# Patient Record
Sex: FEMALE | Race: WHITE | NOT HISPANIC OR LATINO | Employment: OTHER | URBAN - METROPOLITAN AREA
[De-identification: names, ages, dates, MRNs, and addresses within clinical notes are randomized per-mention and may not be internally consistent; named-entity substitution may affect disease eponyms.]

---

## 2024-09-28 ENCOUNTER — ANESTHESIA EVENT (OUTPATIENT)
Dept: SURGERY | Facility: HOSPITAL | Age: 79
End: 2024-09-28
Payer: MEDICARE

## 2024-09-28 ENCOUNTER — HOSPITAL ENCOUNTER (INPATIENT)
Facility: HOSPITAL | Age: 79
LOS: 4 days | Discharge: NURSING, REHAB OR LONG-TERM CARE | DRG: 481 | End: 2024-10-02
Attending: EMERGENCY MEDICINE | Admitting: HOSPITALIST
Payer: MEDICARE

## 2024-09-28 DIAGNOSIS — D62 ACUTE BLOOD LOSS ANEMIA: ICD-10-CM
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
## 2024-09-28 DIAGNOSIS — E83.39 HYPOPHOSPHATEMIA: ICD-10-CM

## 2024-09-28 DIAGNOSIS — Z86.79 HISTORY OF CAROTID STENOSIS: ICD-10-CM

## 2024-09-28 DIAGNOSIS — I10 HYPERTENSION, UNSPECIFIED TYPE: ICD-10-CM

## 2024-09-28 DIAGNOSIS — T14.90XA TRAUMA: ICD-10-CM

## 2024-09-28 DIAGNOSIS — I07.1 MILD TRICUSPID REGURGITATION: ICD-10-CM

## 2024-09-28 DIAGNOSIS — I50.32 CHRONIC DIASTOLIC HEART FAILURE: ICD-10-CM

## 2024-09-28 DIAGNOSIS — E55.9 VITAMIN D INSUFFICIENCY: ICD-10-CM

## 2024-09-28 DIAGNOSIS — E87.1 HYPONATREMIA: ICD-10-CM

## 2024-09-28 DIAGNOSIS — I34.0 MILD MITRAL REGURGITATION: ICD-10-CM

## 2024-09-28 DIAGNOSIS — S72.141A CLOSED COMMINUTED INTERTROCHANTERIC FRACTURE OF RIGHT FEMUR, INITIAL ENCOUNTER: Primary | ICD-10-CM

## 2024-09-28 DIAGNOSIS — S72.141A DISPLACED INTERTROCHANTERIC FRACTURE OF RIGHT FEMUR, INITIAL ENCOUNTER FOR CLOSED FRACTURE: ICD-10-CM

## 2024-09-28 DIAGNOSIS — H57.9 ITCHY EYES: ICD-10-CM

## 2024-09-28 PROBLEM — F03.C0 ADVANCED DEMENTIA: Status: ACTIVE | Noted: 2024-09-28

## 2024-09-28 LAB
25(OH)D3+25(OH)D2 SERPL-MCNC: 27 NG/ML (ref 30–96)
ABO + RH BLD: NORMAL
ALBUMIN SERPL BCP-MCNC: 3.4 G/DL (ref 3.5–5.2)
ALP SERPL-CCNC: 136 U/L (ref 55–135)
ALT SERPL W/O P-5'-P-CCNC: 13 U/L (ref 10–44)
ANION GAP SERPL CALC-SCNC: 10 MMOL/L (ref 8–16)
AST SERPL-CCNC: 28 U/L (ref 10–40)
BASOPHILS # BLD AUTO: 0.09 K/UL (ref 0–0.2)
BASOPHILS NFR BLD: 0.8 % (ref 0–1.9)
BILIRUB SERPL-MCNC: 0.5 MG/DL (ref 0.1–1)
BLD GP AB SCN CELLS X3 SERPL QL: NORMAL
BUN SERPL-MCNC: 16 MG/DL (ref 8–23)
CALCIUM SERPL-MCNC: 9.3 MG/DL (ref 8.7–10.5)
CHLORIDE SERPL-SCNC: 100 MMOL/L (ref 95–110)
CO2 SERPL-SCNC: 19 MMOL/L (ref 23–29)
CREAT SERPL-MCNC: 0.8 MG/DL (ref 0.5–1.4)
DIFFERENTIAL METHOD BLD: ABNORMAL
EOSINOPHIL # BLD AUTO: 0.1 K/UL (ref 0–0.5)
EOSINOPHIL NFR BLD: 0.8 % (ref 0–8)
ERYTHROCYTE [DISTWIDTH] IN BLOOD BY AUTOMATED COUNT: 14.5 % (ref 11.5–14.5)
EST. GFR  (NO RACE VARIABLE): >60 ML/MIN/1.73 M^2
ESTIMATED AVG GLUCOSE: 103 MG/DL (ref 68–131)
GLUCOSE SERPL-MCNC: 104 MG/DL (ref 70–110)
HBA1C MFR BLD: 5.2 % (ref 4–5.6)
HCT VFR BLD AUTO: 38 % (ref 37–48.5)
HCV AB SERPL QL IA: NORMAL
HGB BLD-MCNC: 12.4 G/DL (ref 12–16)
HIV 1+2 AB+HIV1 P24 AG SERPL QL IA: NORMAL
IMM GRANULOCYTES # BLD AUTO: 0.07 K/UL (ref 0–0.04)
IMM GRANULOCYTES NFR BLD AUTO: 0.6 % (ref 0–0.5)
INR PPP: 1 (ref 0.8–1.2)
LYMPHOCYTES # BLD AUTO: 1.9 K/UL (ref 1–4.8)
LYMPHOCYTES NFR BLD: 17 % (ref 18–48)
MAGNESIUM SERPL-MCNC: 1.7 MG/DL (ref 1.6–2.6)
MCH RBC QN AUTO: 28.6 PG (ref 27–31)
MCHC RBC AUTO-ENTMCNC: 32.6 G/DL (ref 32–36)
MCV RBC AUTO: 88 FL (ref 82–98)
MONOCYTES # BLD AUTO: 1.1 K/UL (ref 0.3–1)
MONOCYTES NFR BLD: 9.5 % (ref 4–15)
NEUTROPHILS # BLD AUTO: 7.9 K/UL (ref 1.8–7.7)
NEUTROPHILS NFR BLD: 71.3 % (ref 38–73)
NRBC BLD-RTO: 0 /100 WBC
PHOSPHATE SERPL-MCNC: 3.1 MG/DL (ref 2.7–4.5)
PLATELET # BLD AUTO: 217 K/UL (ref 150–450)
PMV BLD AUTO: 12 FL (ref 9.2–12.9)
POTASSIUM SERPL-SCNC: 3.9 MMOL/L (ref 3.5–5.1)
PREALB SERPL-MCNC: 15 MG/DL (ref 20–43)
PROT SERPL-MCNC: 6.8 G/DL (ref 6–8.4)
PROTHROMBIN TIME: 10.6 SEC (ref 9–12.5)
RBC # BLD AUTO: 4.33 M/UL (ref 4–5.4)
SODIUM SERPL-SCNC: 129 MMOL/L (ref 136–145)
SPECIMEN OUTDATE: NORMAL
TRANSFERRIN SERPL-MCNC: 306 MG/DL (ref 200–375)
WBC # BLD AUTO: 11.14 K/UL (ref 3.9–12.7)

## 2024-09-28 PROCEDURE — 84100 ASSAY OF PHOSPHORUS: CPT | Performed by: PHYSICIAN ASSISTANT

## 2024-09-28 PROCEDURE — 86920 COMPATIBILITY TEST SPIN: CPT

## 2024-09-28 PROCEDURE — 86803 HEPATITIS C AB TEST: CPT | Performed by: PHYSICIAN ASSISTANT

## 2024-09-28 PROCEDURE — 63600175 PHARM REV CODE 636 W HCPCS: Performed by: HOSPITALIST

## 2024-09-28 PROCEDURE — 83036 HEMOGLOBIN GLYCOSYLATED A1C: CPT | Performed by: PHYSICIAN ASSISTANT

## 2024-09-28 PROCEDURE — 86850 RBC ANTIBODY SCREEN: CPT

## 2024-09-28 PROCEDURE — 93010 ELECTROCARDIOGRAM REPORT: CPT | Mod: ,,, | Performed by: INTERNAL MEDICINE

## 2024-09-28 PROCEDURE — 86901 BLOOD TYPING SEROLOGIC RH(D): CPT

## 2024-09-28 PROCEDURE — 25000003 PHARM REV CODE 250: Performed by: HOSPITALIST

## 2024-09-28 PROCEDURE — 93005 ELECTROCARDIOGRAM TRACING: CPT

## 2024-09-28 PROCEDURE — 80053 COMPREHEN METABOLIC PANEL: CPT | Performed by: EMERGENCY MEDICINE

## 2024-09-28 PROCEDURE — 84134 ASSAY OF PREALBUMIN: CPT | Performed by: PHYSICIAN ASSISTANT

## 2024-09-28 PROCEDURE — 85025 COMPLETE CBC W/AUTO DIFF WBC: CPT | Performed by: EMERGENCY MEDICINE

## 2024-09-28 PROCEDURE — 11000001 HC ACUTE MED/SURG PRIVATE ROOM

## 2024-09-28 PROCEDURE — 86900 BLOOD TYPING SEROLOGIC ABO: CPT

## 2024-09-28 PROCEDURE — 87389 HIV-1 AG W/HIV-1&-2 AB AG IA: CPT | Performed by: PHYSICIAN ASSISTANT

## 2024-09-28 PROCEDURE — 83735 ASSAY OF MAGNESIUM: CPT | Performed by: PHYSICIAN ASSISTANT

## 2024-09-28 PROCEDURE — 96374 THER/PROPH/DIAG INJ IV PUSH: CPT

## 2024-09-28 PROCEDURE — 21400001 HC TELEMETRY ROOM

## 2024-09-28 PROCEDURE — 84466 ASSAY OF TRANSFERRIN: CPT | Performed by: PHYSICIAN ASSISTANT

## 2024-09-28 PROCEDURE — 63600175 PHARM REV CODE 636 W HCPCS: Performed by: EMERGENCY MEDICINE

## 2024-09-28 PROCEDURE — 99223 1ST HOSP IP/OBS HIGH 75: CPT | Mod: 57,,, | Performed by: ORTHOPAEDIC SURGERY

## 2024-09-28 PROCEDURE — 82306 VITAMIN D 25 HYDROXY: CPT | Performed by: PHYSICIAN ASSISTANT

## 2024-09-28 PROCEDURE — 99285 EMERGENCY DEPT VISIT HI MDM: CPT | Mod: 25

## 2024-09-28 PROCEDURE — 85610 PROTHROMBIN TIME: CPT | Performed by: EMERGENCY MEDICINE

## 2024-09-28 RX ORDER — MEMANTINE HYDROCHLORIDE 5 MG/1
5 TABLET ORAL DAILY
Status: DISCONTINUED | OUTPATIENT
Start: 2024-09-29 | End: 2024-10-02 | Stop reason: HOSPADM

## 2024-09-28 RX ORDER — NAPROXEN SODIUM 220 MG/1
81 TABLET, FILM COATED ORAL DAILY
Status: ON HOLD | COMMUNITY
End: 2024-10-01 | Stop reason: HOSPADM

## 2024-09-28 RX ORDER — ESCITALOPRAM OXALATE 10 MG/1
1 TABLET ORAL DAILY
COMMUNITY

## 2024-09-28 RX ORDER — TALC
6 POWDER (GRAM) TOPICAL NIGHTLY PRN
Status: DISCONTINUED | OUTPATIENT
Start: 2024-09-28 | End: 2024-10-02 | Stop reason: HOSPADM

## 2024-09-28 RX ORDER — HYDRALAZINE HYDROCHLORIDE 50 MG/1
50 TABLET, FILM COATED ORAL EVERY 8 HOURS PRN
Status: DISCONTINUED | OUTPATIENT
Start: 2024-09-29 | End: 2024-10-02 | Stop reason: HOSPADM

## 2024-09-28 RX ORDER — MEMANTINE HYDROCHLORIDE 5 MG/1
1 TABLET ORAL DAILY
COMMUNITY

## 2024-09-28 RX ORDER — MORPHINE SULFATE 4 MG/ML
4 INJECTION, SOLUTION INTRAMUSCULAR; INTRAVENOUS
Status: COMPLETED | OUTPATIENT
Start: 2024-09-28 | End: 2024-09-28

## 2024-09-28 RX ORDER — OXYCODONE HYDROCHLORIDE 5 MG/1
10 TABLET ORAL
Status: DISCONTINUED | OUTPATIENT
Start: 2024-09-28 | End: 2024-09-30

## 2024-09-28 RX ORDER — OXYCODONE HYDROCHLORIDE 5 MG/1
5 TABLET ORAL
Status: DISCONTINUED | OUTPATIENT
Start: 2024-09-28 | End: 2024-09-30

## 2024-09-28 RX ORDER — MUPIROCIN 20 MG/G
1 OINTMENT TOPICAL
Status: DISCONTINUED | OUTPATIENT
Start: 2024-09-28 | End: 2024-10-02 | Stop reason: HOSPADM

## 2024-09-28 RX ORDER — MORPHINE SULFATE 2 MG/ML
2 INJECTION, SOLUTION INTRAMUSCULAR; INTRAVENOUS
Status: DISCONTINUED | OUTPATIENT
Start: 2024-09-28 | End: 2024-09-30

## 2024-09-28 RX ORDER — AMLODIPINE BESYLATE 2.5 MG/1
1 TABLET ORAL DAILY
Status: ON HOLD | COMMUNITY
End: 2024-10-01 | Stop reason: HOSPADM

## 2024-09-28 RX ORDER — ONDANSETRON HYDROCHLORIDE 2 MG/ML
4 INJECTION, SOLUTION INTRAVENOUS EVERY 12 HOURS PRN
Status: DISCONTINUED | OUTPATIENT
Start: 2024-09-28 | End: 2024-10-02 | Stop reason: HOSPADM

## 2024-09-28 RX ORDER — LIDOCAINE HYDROCHLORIDE 10 MG/ML
1 INJECTION, SOLUTION EPIDURAL; INFILTRATION; INTRACAUDAL; PERINEURAL
Status: DISCONTINUED | OUTPATIENT
Start: 2024-09-28 | End: 2024-10-02 | Stop reason: HOSPADM

## 2024-09-28 RX ORDER — SODIUM CHLORIDE 0.9 % (FLUSH) 0.9 %
10 SYRINGE (ML) INJECTION
Status: DISCONTINUED | OUTPATIENT
Start: 2024-09-28 | End: 2024-10-02 | Stop reason: HOSPADM

## 2024-09-28 RX ORDER — AMLODIPINE BESYLATE 5 MG/1
5 TABLET ORAL DAILY
Status: DISCONTINUED | OUTPATIENT
Start: 2024-09-28 | End: 2024-10-01

## 2024-09-28 RX ORDER — PREGABALIN 75 MG/1
75 CAPSULE ORAL NIGHTLY
Status: DISCONTINUED | OUTPATIENT
Start: 2024-09-28 | End: 2024-09-30

## 2024-09-28 RX ORDER — ESCITALOPRAM OXALATE 10 MG/1
10 TABLET ORAL DAILY
Status: DISCONTINUED | OUTPATIENT
Start: 2024-09-29 | End: 2024-10-02 | Stop reason: HOSPADM

## 2024-09-28 RX ORDER — MORPHINE SULFATE 4 MG/ML
4 INJECTION, SOLUTION INTRAMUSCULAR; INTRAVENOUS EVERY 4 HOURS PRN
Status: DISCONTINUED | OUTPATIENT
Start: 2024-09-28 | End: 2024-09-28

## 2024-09-28 RX ORDER — ACETAMINOPHEN 500 MG
1000 TABLET ORAL EVERY 8 HOURS
Status: DISPENSED | OUTPATIENT
Start: 2024-09-28 | End: 2024-09-29

## 2024-09-28 RX ORDER — HYDROCODONE BITARTRATE AND ACETAMINOPHEN 500; 5 MG/1; MG/1
TABLET ORAL
Status: DISCONTINUED | OUTPATIENT
Start: 2024-09-28 | End: 2024-10-02 | Stop reason: HOSPADM

## 2024-09-28 RX ORDER — BISACODYL 10 MG/1
10 SUPPOSITORY RECTAL DAILY PRN
Status: DISCONTINUED | OUTPATIENT
Start: 2024-09-28 | End: 2024-10-02 | Stop reason: HOSPADM

## 2024-09-28 RX ORDER — SODIUM CHLORIDE 9 MG/ML
INJECTION, SOLUTION INTRAVENOUS CONTINUOUS
Status: ACTIVE | OUTPATIENT
Start: 2024-09-28 | End: 2024-09-29

## 2024-09-28 RX ADMIN — MORPHINE SULFATE 2 MG: 2 INJECTION, SOLUTION INTRAMUSCULAR; INTRAVENOUS at 11:09

## 2024-09-28 RX ADMIN — MORPHINE SULFATE 4 MG: 4 INJECTION INTRAVENOUS at 03:09

## 2024-09-28 RX ADMIN — PREGABALIN 75 MG: 75 CAPSULE ORAL at 10:09

## 2024-09-28 RX ADMIN — SODIUM CHLORIDE: 9 INJECTION, SOLUTION INTRAVENOUS at 10:09

## 2024-09-28 RX ADMIN — OXYCODONE HYDROCHLORIDE 10 MG: 5 TABLET ORAL at 11:09

## 2024-09-28 RX ADMIN — ACETAMINOPHEN 1000 MG: 500 TABLET ORAL at 10:09

## 2024-09-28 RX ADMIN — HYDRALAZINE HYDROCHLORIDE 50 MG: 50 TABLET ORAL at 11:09

## 2024-09-28 RX ADMIN — AMLODIPINE BESYLATE 5 MG: 5 TABLET ORAL at 10:09

## 2024-09-28 NOTE — Clinical Note
Diagnosis: Trauma [[id number]]   Future Attending Provider: NICK PHAN [[id number]]   Reason for IP Medical Treatment  (Clinical interventions that can only be accomplished in the IP setting? ) :: femur fracture   Reason for IP Medical Treatment  (Clinical interventions that can only be accomplished in the IP setting? ) :: hyponatremia   Plans for Post-Acute care--if anticipated (pick the single best option):: A. No post acute care anticipated at this time

## 2024-09-28 NOTE — ED NOTES
Nurses Note -- 4 Eyes      9/28/2024   5:15 PM      Skin assessed during: Admit      [x] No Altered Skin Integrity Present    []Prevention Measures Documented      [] Yes- Altered Skin Integrity Present or Discovered   [] LDA Added if Not in Epic (Describe Wound)   [] New Altered Skin Integrity was Present on Admit and Documented in LDA   [] Wound Image Taken    Wound Care Consulted? No    Attending Nurse:  Darling Asher RN/Staff Member:  Conner BHAKTA

## 2024-09-28 NOTE — ED TRIAGE NOTES
Pt presents to ED after trip and fall where pt landed on right hip. +shortening and rotation to right hip. Hx dementia Oriented to self at baseline.

## 2024-09-28 NOTE — ED TRIAGE NOTES
Toña Carrero, a 79 y.o. female presents to the ED from the [facility]. Unwitnessed fall onto right side. Arrives to ED bonded by EMS with shortening and rotation. Patient baseline disoriented. Oriented to person only.     Triage note:  Chief Complaint   Patient presents with    Hip Pain    Hip Injury     Broken hip right  Shortening and rotation, bonded by EMS    Fall     Review of patient's allergies indicates:  No Known Allergies  History reviewed. No pertinent past medical history.

## 2024-09-28 NOTE — ED PROVIDER NOTES
Encounter Date: 9/28/2024       History     Chief Complaint   Patient presents with    Hip Pain    Hip Injury     Broken hip right  Shortening and rotation, bonded by EMS    Fall     HPI is a 71-year-old female with a past medical history remarkable for advanced dementia, aortic valve stenosis, coronary artery disease, hypertension, hyperlipidemia, mitral regurgitation, mitral valve prolapse currently living at an assisted living facility who presents today with right hip pain after a fall.  The patient's daughter explains that she was told that while the patient was in the dining room she was reaching for something and had a fall onto her right side.  The patient was noted to have shortening and internal rotation of her right lower extremity by EMS and so had a sheet wrapped about her pelvis and taped.  The patient is unable to provide additional details, unclear if patient had head trauma or loss of consciousness.  The patient notes pain in her right hip with any movement.  Review of patient's allergies indicates:  No Known Allergies  History reviewed. No pertinent past medical history.  History reviewed. No pertinent surgical history.  No family history on file.  Social History     Tobacco Use    Smoking status: Never    Smokeless tobacco: Never       Physical Exam     Initial Vitals   BP Pulse Resp Temp SpO2   09/28/24 1424 09/28/24 1424 09/28/24 1424 09/28/24 1518 09/28/24 1424   (!) 203/91 70 16 98.5 °F (36.9 °C) (!) 93 %      MAP       --                Physical Exam    Nursing note and vitals reviewed.  Constitutional: Patient appears well-developed and well-nourished. No distress.   HENT:   Head: Normocephalic and atraumatic.   Eyes: Conjunctivae and EOM are normal. Pupils are equal, round, and reactive to light.   Neck: Neck supple.   Normal range of motion.  Cardiovascular: Normal rate, regular rhythm, normal heart sounds and intact distal pulses.   Pulmonary/Chest: Breath sounds normal.   Abdominal:  Abdomen is soft. Patient exhibits no distension. There is no abdominal tenderness.   Musculoskeletal:      Cervical back: Normal range of motion and neck supple, no midline C spine TTP, ? Midline TTP diffusely T/L spine without step offs    RLE with TTP along R prox lateral hip with shortening and internal rotation of RLE, no deformity/TTP R knee, R ankle, R foot. No apparent TTP L lateral hip, L knee, L ankle.   Neurological: Awake, alert, moving all extremities. Wiggles toes BL, SILT grossly BL Le   Skin: Skin is warm and dry.  Psych: Normal mood/affect    ED Course   Procedures  Labs Reviewed   CBC W/ AUTO DIFFERENTIAL - Abnormal       Result Value    WBC 11.14      RBC 4.33      Hemoglobin 12.4      Hematocrit 38.0      MCV 88      MCH 28.6      MCHC 32.6      RDW 14.5      Platelets 217      MPV 12.0      Immature Granulocytes 0.6 (*)     Gran # (ANC) 7.9 (*)     Immature Grans (Abs) 0.07 (*)     Lymph # 1.9      Mono # 1.1 (*)     Eos # 0.1      Baso # 0.09      nRBC 0      Gran % 71.3      Lymph % 17.0 (*)     Mono % 9.5      Eosinophil % 0.8      Basophil % 0.8      Differential Method Automated      Narrative:     Release to patient->Immediate   COMPREHENSIVE METABOLIC PANEL - Abnormal    Sodium 129 (*)     Potassium 3.9      Chloride 100      CO2 19 (*)     Glucose 104      BUN 16      Creatinine 0.8      Calcium 9.3      Total Protein 6.8      Albumin 3.4 (*)     Total Bilirubin 0.5      Alkaline Phosphatase 136 (*)     AST 28      ALT 13      eGFR >60.0      Anion Gap 10      Narrative:     Release to patient->Immediate   PREALBUMIN - Abnormal    Prealbumin 15 (*)     Narrative:     ADD ON TRANSFERRIN, PHOS AND MAG PER VELVET TAMAYO MD ORDER# [id number],   [id number] AND [id number] @  09/28/2024  18:02   Release to patient->Immediate   add on PALB and VID25 per Velvet Tamayo MD  09/28/2024  18:45    HIV 1 / 2 ANTIBODY    HIV 1/2 Ag/Ab Non-reactive      Narrative:     Release to patient->Immediate   HEPATITIS  C ANTIBODY    Hepatitis C Ab Non-reactive      Narrative:     Release to patient->Immediate   PROTIME-INR    Prothrombin Time 10.6      INR 1.0      Narrative:     Release to patient->Immediate   HEMOGLOBIN A1C   MAGNESIUM   PHOSPHORUS   PREALBUMIN   TRANSFERRIN   VITAMIN D   MAGNESIUM    Magnesium 1.7      Narrative:     ADD ON TRANSFERRIN, PHOS AND MAG PER VELVET TAMAYO MD ORDER# [id number],   [id number] AND [id number] @  09/28/2024  18:02   Release to patient->Immediate   PHOSPHORUS    Phosphorus 3.1      Narrative:     ADD ON TRANSFERRIN, PHOS AND MAG PER VELVET TAMAYO MD ORDER# [id number],   [id number] AND [id number] @  09/28/2024  18:02   Release to patient->Immediate   HEMOGLOBIN A1C    Hemoglobin A1C 5.2      Estimated Avg Glucose 103      Narrative:     Add on GHGB to #[id number] per Velvet Tamayo MD on  09/28/2024  19:45       ADD ON TRANSFERRIN, PHOS AND MAG PER VELVET TAMAYO MD ORDER# [id number],   [id number] AND [id number] @  09/28/2024  18:02   Release to patient->Immediate   add on PALB and VID25 per Velvet Tamayo MD  09/28/2024  18:45    TRANSFERRIN   VITAMIN D   TYPE & SCREEN    Group & Rh O POS      Indirect Roland NEG      Specimen Outdate 10/01/2024 23:59            Imaging Results              CT Pelvis Without Contrast (Final result)  Result time 09/28/24 19:31:04      Final result by Dario Lugo MD (09/28/24 19:31:04)                   Impression:      1. Intertrochanteric fracture of the proximal right femur as described.  2. Please see CT of the lumbar spine for details of the pelvic content and lumbar spine.      Electronically signed by: Dario Lugo MD  Date:    09/28/2024  Time:    19:31               Narrative:    EXAMINATION:  CT PELVIS WITHOUT CONTRAST    CLINICAL HISTORY:  Pelvic fracture;    TECHNIQUE:  Axial images of the pelvis were obtained at 1.25 mm intervals without administration of IV contrast.  Coronal and sagittal reformatted images were reviewed.    COMPARISON:  Radiograph  09/28/2024    FINDINGS:  There is acute comminuted intertrochanteric fracture of the proximal right femur noting fracture fragments lie about the fracture plane.  The femoroacetabular joint is intact.  There appears to be postsurgical change of the right iliac wing.  The pubic symphysis is intact.  Left hip is intact.  There are surgical changes of the spine, please see separately dictated report for details.  There is edema/hematoma about the fracture site.                                       CT Lumbar Spine Without Contrast (Final result)  Result time 09/28/24 19:15:17      Final result by Dario Lugo MD (09/28/24 19:15:17)                   Impression:      1. No convincing acute displaced fracture or dislocation of the lumbar spine noting degenerative changes and surgical change as described.  2. Distention of the urinary bladder, correlation with any history of urinary retention or outlet obstruction.  3. Please see above for several additional findings.      Electronically signed by: aDrio Lugo MD  Date:    09/28/2024  Time:    19:15               Narrative:    EXAMINATION:  CT LUMBAR SPINE WITHOUT CONTRAST    CLINICAL HISTORY:  Back trauma, no prior imaging (Age >= 16y);    TECHNIQUE:  Low-dose axial, sagittal and coronal reformations are obtained through the lumbar spine.  Contrast was not administered.    COMPARISON:  None.    FINDINGS:  There is motion artifact.  There is osteopenia.    Images of the lower thorax are grossly unremarkable.  The spleen, pancreas, liver and adrenal glands are grossly unremarkable.  There is questionable cholelithiasis or sludge.  There is no hydronephrosis or nephrolithiasis.  A cyst arises from the interpolar region of the right kidney measuring 2.6 cm.  The bilateral ureters are unremarkable.  The urinary bladder is distended.  The bowel is grossly unremarkable.  There is atherosclerotic calcification of the aorta and its branches.    Sagittal reformatted  imaging demonstrates grade 1 anterolisthesis of L4 on L5.  There is retrolisthesis, grade 1, of L3 on L4 and L2 on L3.  Disc space height loss most significantly involves L2-L3.  Posterior spinal fusion hardware spans L4-L5 noting disc spacing material at L4-L5.  There is multilevel facet arthropathy.  No convincing acute displaced fracture or dislocation of the lumbar spine.  Motion artifact limits evaluation for spondylitic changes.    L1-L2: There is a broad-based disc bulge without significant neuroforaminal narrowing.  There is mild spinal canal stenosis.  There is facet arthropathy.    L2-L3: There is a broad-based disc bulge resulting in mild spinal canal stenosis.  There is mild to moderate bilateral neuroforaminal narrowing.  There is facet arthropathy.    L3-L4: There is a broad-based disc bulge with ligamentum flavum hypertrophy resulting in moderate to severe spinal canal stenosis and moderate bilateral neuroforaminal narrowing.  There is facet arthropathy.    L4-L5: There is a broad-based disc bulge without significant spinal canal stenosis or neuroforaminal narrowing.    L5-S1: No significant neuroforaminal narrowing or spinal canal stenosis.  There is a broad-based disc bulge.                                       CT Thoracic Spine Without Contrast (Final result)  Result time 09/28/24 19:43:55      Final result by Dario Lugo MD (09/28/24 19:43:55)                   Impression:      1. Significantly limited examination given extensive motion artifact.  There is height loss involving T8 noting possible partial fusion to T7.  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  Correlation however with any focal tenderness recommended.  Compression fracture cannot be excluded in this setting although the appearance suggests congenital finding.  Please see above for additional findings.  2. Questionable  pulmonary nodules within the right hemithorax versus artifact.  One year follow-up as warranted.  3. Please see above for several additional findings.      Electronically signed by: Dario Lugo MD  Date:    09/28/2024  Time:    19:43               Narrative:    EXAMINATION:  CT THORACIC SPINE WITHOUT CONTRAST    CLINICAL HISTORY:  Back trauma, no prior imaging (Age >= 16y);    TECHNIQUE:  Axial images of the thoracic spine were obtained at 1.25 mm intervals without administration of IV contrast.  Coronal and sagittal reformatted images were reviewed.    COMPARISON:  None    FINDINGS:  There is motion artifact.  There is osteopenia.    Please see separately dictated report for details of the lumbar spine.  There are a few scattered 2 mm pulmonary nodules within the visualized right hemithorax versus artifact, motion artifact significantly limits evaluation.  The posterior paraspinous soft tissues are remarkable for subcutaneous edema.  Sagittal reformatted imaging demonstrates adequate alignment of the thoracic spine noting multilevel disc space height loss.  There is vertebral body height loss involving T8 noting limited evaluation given motion artifact.  Correlation with any focal tenderness recommended.  No significant vertebral body height loss elsewhere.  The facet joints are aligned.  AP spinal alignment is remarkable for levo scoliotic curvature.  No convincing acute displaced rib fracture.                                       CT Cervical Spine Without Contrast (Final result)  Result time 09/28/24 19:07:44      Final result by Dario Lugo MD (09/28/24 19:07:44)                   Impression:      1. Allowing for motion artifact, no convincing acute displaced fracture or dislocation of the cervical spine.      Electronically signed by: Dario Lugo MD  Date:    09/28/2024  Time:    19:07               Narrative:    EXAMINATION:  CT CERVICAL SPINE WITHOUT CONTRAST    CLINICAL  HISTORY:  trauma;    TECHNIQUE:  Low dose axial images, sagittal and coronal reformations were performed though the cervical spine.  Contrast was not administered.    COMPARISON:  None    FINDINGS:  There is motion artifact.  There is osteopenia.    The visualized portions of the lung apices are remarkable for mild biapical atelectasis/scarring.  The thyroid gland, parotid glands, and remaining visualized salivary glands are grossly unremarkable allowing for motion artifact.  No significant tonsillar enlargement.  No significant cervical lymphadenopathy.  There is carotid vascular calcification.  The posterior paraspinous and hypopharyngeal soft tissues are grossly unremarkable.    Sagittal reformatted imaging demonstrates grade 1 anterolisthesis of C4 on C3.  There is grade 1 anterolisthesis of C6 on C5.  The facet joints are aligned.  There is disc space height loss primarily involving C5-C6.  No convincing acute displaced fracture or dislocation of the cervical spine.  Motion artifact limits evaluation for spondylitic changes.  Allowing for this, there is multilevel mild to moderate spinal canal stenosis and neuroforaminal narrowing.  The airway is patent.                                       CT Head Without Contrast (Final result)  Result time 09/28/24 19:03:02      Final result by Dario Lugo MD (09/28/24 19:03:02)                   Impression:      1. Allowing for motion artifact, no convincing acute intracranial abnormalities noting sequela of chronic microvascular ischemic change and senescent change.  2. Sinus disease.      Electronically signed by: Dario Lugo MD  Date:    09/28/2024  Time:    19:03               Narrative:    EXAMINATION:  CT HEAD WITHOUT CONTRAST    CLINICAL HISTORY:  Head trauma, intracranial arterial injury suspected;    TECHNIQUE:  Low dose axial images were obtained through the head.  Coronal and sagittal reformations were also performed. Contrast was not  administered.    COMPARISON:  None.    FINDINGS:  There is motion artifact.    There is generalized cerebral volume loss.  There is hypoattenuation in a periventricular fashion, likely sequela of chronic microvascular ischemic change.  There is no evidence of acute major vascular territory infarct, hemorrhage, or mass.  There is no hydrocephalus.  There are no abnormal extra-axial fluid collections.  There are mucous retention cysts or polyps within the bilateral maxillary sinuses.  There is opacification of the left sphenoid sinus.  Wall thickening of the sphenoid sinus suggests chronic component of sinusitis.  Otherwise, the visualized paranasal sinuses and mastoid air cells are clear, and there is no evidence of calvarial fracture.  The visualized soft tissues are unremarkable.                                       X-Ray Chest AP Portable (Final result)  Result time 09/28/24 17:42:51      Final result by Santiago Taylor MD (09/28/24 17:42:51)                   Impression:      No acute cardiopulmonary abnormality.    Electronically signed by resident: Eran Mariscal  Date:    09/28/2024  Time:    16:59    Electronically signed by: Santiago Taylor MD  Date:    09/28/2024  Time:    17:42               Narrative:    EXAMINATION:  XR CHEST AP PORTABLE    CLINICAL HISTORY:  hypoxia;    TECHNIQUE:  Single frontal view of the chest was performed.    COMPARISON:  None    FINDINGS:  The patient is rotated.    LINES & TUBES: Cardiac monitoring leads overlie the bilateral danie-thoraces.    HEART & MEDIASTINUM: Mediastinal structures are midline.  Cardiac silhouette is normal in size.  Mediastinal and hilar contours are unremarkable...    PLEURA: No pleural effusion.  No pneumothorax.    LUNGS: Lungs are symmetrically expanded and clear.    SOFT TISSUE / BONES: Thoracic skeleton is unremarkable.  Incompletely visualized spinal hardware overlying the lumbar spine.                                       X-Ray Femur Ap/Lat Right  (Final result)  Result time 09/28/24 17:21:41      Final result by Dario Lugo MD (09/28/24 17:21:41)                   Impression:      1. Proximal femoral fracture as above.      Electronically signed by: Dario Lugo MD  Date:    09/28/2024  Time:    17:21               Narrative:    EXAMINATION:  XR FEMUR 2 VIEW RIGHT    CLINICAL HISTORY:  Injury, unspecified, initial encounter    TECHNIQUE:  AP and lateral views of the right femur were performed.    COMPARISON:  None    FINDINGS:  Four views right femur.    There is acute impacted intertrochanteric fracture of the proximal right femur.  No femoroacetabular dislocation.  There is osteopenia.  The remaining aspects of the femur are intact.  There are degenerative changes of the knee.  No large knee joint effusion.                                       X-Ray Pelvis Routine AP (Final result)  Result time 09/28/24 16:51:05   Procedure changed from X-Ray Hip 2 or 3 views Right with Pelvis when performed     Final result by Dario Lugo MD (09/28/24 16:51:05)                   Impression:      1. Proximal right femoral fracture as described.      Electronically signed by: Dario Lugo MD  Date:    09/28/2024  Time:    16:51               Narrative:    EXAMINATION:  XR PELVIS ROUTINE AP    CLINICAL HISTORY:  trauma;    TECHNIQUE:  AP view of the pelvis was performed.    COMPARISON:  None.    FINDINGS:  Single-view pelvis.    There is osteopenia.  The bilateral sacroiliac joints are intact.  The pubic symphysis is intact.  The bilateral femoral heads maintain appropriate relationship with their respective acetabula.  There is an impacted intertrochanteric fracture of the right femur.  The pubic symphysis is intact.  Surgical changes noted of the lumbar spine.                                       Medications   morphine injection 4 mg (4 mg Intravenous Given 9/28/24 2035)   morphine injection 4 mg (4 mg Intravenous Given 9/28/24 1548)     Medical  Decision Making                    I have personally reviewed and interpreted the patients laboratory studies:  Mild hyponatremia with sodium 129, mild non-anion gap metabolic acidosis, INR 1.0, hemoglobin 12.4  I have personally reviewed and interpreted imaging studies:  Pelvis x-ray without obvious pelvic fracture on my review, patient with evidence of proximal right femoral fracture appears to be intertrochanteric  I have personally reviewed and interpreted the patient's EKG:  Normal sinus rhythm at 63 beats per minute, QRS 80, , no ischemic ST/T-wave changes      I have also reviewed the following:   external records:  Reviewed summary from 09/19/2024 from an outside hospital after an admission for dementia and concern for hemoptysis and hematemesis.  Patient was placed at [facility] after discharge       Patient with a fall, appears to be mechanical just prior to arrival with right hip pain in internal rotation and shortening upon arrival.    X-ray is remarkable for proximal femur fracture, no clear evidence of pelvic ring fracture    The patient has significant pain with rolling but did endorse some diffuse pain in her thoracic and lumbar spine and so CT thoracic and lumbar spine were ordered in addition to CT head and C-spine given unclear head trauma or loss of consciousness.    Orthopedic surgery was consulted for right proximal femur fracture and will plan for admission to medicine with Orthopedic surgery following.         CT head without evidence of acute abnormality, CT cervical spine unremarkable, CT thoracic spine with question of compression at T7-8 however on discussion with the patient, she has no pain at that specific level.  CT pelvis redemonstrates intertrochanteric care fracture on the right.     Patient was admitted to Internal Medicine with plan for surgical intervention likely tomorrow with orthopedic surgery        Clinical Impression:  Final diagnoses:  [T14.90XA]  Trauma  [W19.XXXA] Fall          ED Disposition Condition    Admit                 Pamela Klein MD  09/28/24 2044

## 2024-09-29 ENCOUNTER — ANESTHESIA (OUTPATIENT)
Dept: SURGERY | Facility: HOSPITAL | Age: 79
End: 2024-09-29
Payer: MEDICARE

## 2024-09-29 PROBLEM — E55.9 VITAMIN D INSUFFICIENCY: Status: ACTIVE | Noted: 2024-09-29

## 2024-09-29 PROBLEM — I50.32 CHRONIC DIASTOLIC HEART FAILURE: Status: ACTIVE | Noted: 2024-09-29

## 2024-09-29 PROBLEM — Z86.79 HISTORY OF CAROTID STENOSIS: Status: ACTIVE | Noted: 2024-09-29

## 2024-09-29 PROBLEM — E87.1 HYPONATREMIA: Status: ACTIVE | Noted: 2024-09-29

## 2024-09-29 PROBLEM — I34.0 MILD MITRAL REGURGITATION: Status: ACTIVE | Noted: 2024-09-29

## 2024-09-29 PROBLEM — I07.1 MILD TRICUSPID REGURGITATION: Status: ACTIVE | Noted: 2024-09-29

## 2024-09-29 LAB
ALBUMIN SERPL BCP-MCNC: 3.3 G/DL (ref 3.5–5.2)
ALP SERPL-CCNC: 141 U/L (ref 55–135)
ALT SERPL W/O P-5'-P-CCNC: 12 U/L (ref 10–44)
ANION GAP SERPL CALC-SCNC: 8 MMOL/L (ref 8–16)
AST SERPL-CCNC: 29 U/L (ref 10–40)
BASOPHILS # BLD AUTO: 0.07 K/UL (ref 0–0.2)
BASOPHILS NFR BLD: 0.7 % (ref 0–1.9)
BILIRUB SERPL-MCNC: 0.5 MG/DL (ref 0.1–1)
BLD PROD TYP BPU: NORMAL
BLD PROD TYP BPU: NORMAL
BLOOD UNIT EXPIRATION DATE: NORMAL
BLOOD UNIT EXPIRATION DATE: NORMAL
BLOOD UNIT TYPE CODE: 5100
BLOOD UNIT TYPE CODE: 5100
BLOOD UNIT TYPE: NORMAL
BLOOD UNIT TYPE: NORMAL
BUN SERPL-MCNC: 18 MG/DL (ref 8–23)
CALCIUM SERPL-MCNC: 9.3 MG/DL (ref 8.7–10.5)
CHLORIDE SERPL-SCNC: 102 MMOL/L (ref 95–110)
CO2 SERPL-SCNC: 19 MMOL/L (ref 23–29)
CODING SYSTEM: NORMAL
CODING SYSTEM: NORMAL
CREAT SERPL-MCNC: 0.8 MG/DL (ref 0.5–1.4)
CROSSMATCH INTERPRETATION: NORMAL
CROSSMATCH INTERPRETATION: NORMAL
DIFFERENTIAL METHOD BLD: ABNORMAL
DISPENSE STATUS: NORMAL
DISPENSE STATUS: NORMAL
EOSINOPHIL # BLD AUTO: 0.1 K/UL (ref 0–0.5)
EOSINOPHIL NFR BLD: 0.9 % (ref 0–8)
ERYTHROCYTE [DISTWIDTH] IN BLOOD BY AUTOMATED COUNT: 14.5 % (ref 11.5–14.5)
EST. GFR  (NO RACE VARIABLE): >60 ML/MIN/1.73 M^2
GLUCOSE SERPL-MCNC: 108 MG/DL (ref 70–110)
HCT VFR BLD AUTO: 36.8 % (ref 37–48.5)
HGB BLD-MCNC: 11.7 G/DL (ref 12–16)
IMM GRANULOCYTES # BLD AUTO: 0.06 K/UL (ref 0–0.04)
IMM GRANULOCYTES NFR BLD AUTO: 0.6 % (ref 0–0.5)
LYMPHOCYTES # BLD AUTO: 2.2 K/UL (ref 1–4.8)
LYMPHOCYTES NFR BLD: 21.5 % (ref 18–48)
MAGNESIUM SERPL-MCNC: 1.7 MG/DL (ref 1.6–2.6)
MCH RBC QN AUTO: 27.9 PG (ref 27–31)
MCHC RBC AUTO-ENTMCNC: 31.8 G/DL (ref 32–36)
MCV RBC AUTO: 88 FL (ref 82–98)
MONOCYTES # BLD AUTO: 1.2 K/UL (ref 0.3–1)
MONOCYTES NFR BLD: 11.2 % (ref 4–15)
NEUTROPHILS # BLD AUTO: 6.8 K/UL (ref 1.8–7.7)
NEUTROPHILS NFR BLD: 65.1 % (ref 38–73)
NRBC BLD-RTO: 0 /100 WBC
OHS QRS DURATION: 80 MS
OHS QTC CALCULATION: 448 MS
PHOSPHATE SERPL-MCNC: 3.7 MG/DL (ref 2.7–4.5)
PLATELET # BLD AUTO: 322 K/UL (ref 150–450)
PMV BLD AUTO: 9.4 FL (ref 9.2–12.9)
POTASSIUM SERPL-SCNC: 4 MMOL/L (ref 3.5–5.1)
PROT SERPL-MCNC: 6.7 G/DL (ref 6–8.4)
RBC # BLD AUTO: 4.19 M/UL (ref 4–5.4)
SODIUM SERPL-SCNC: 129 MMOL/L (ref 136–145)
TRANS ERYTHROCYTES VOL PATIENT: NORMAL ML
TRANS ERYTHROCYTES VOL PATIENT: NORMAL ML
WBC # BLD AUTO: 10.42 K/UL (ref 3.9–12.7)

## 2024-09-29 PROCEDURE — 80053 COMPREHEN METABOLIC PANEL: CPT | Performed by: HOSPITALIST

## 2024-09-29 PROCEDURE — 36000711: Performed by: ORTHOPAEDIC SURGERY

## 2024-09-29 PROCEDURE — 25000003 PHARM REV CODE 250: Performed by: STUDENT IN AN ORGANIZED HEALTH CARE EDUCATION/TRAINING PROGRAM

## 2024-09-29 PROCEDURE — 37000009 HC ANESTHESIA EA ADD 15 MINS: Performed by: ORTHOPAEDIC SURGERY

## 2024-09-29 PROCEDURE — C1713 ANCHOR/SCREW BN/BN,TIS/BN: HCPCS | Performed by: ORTHOPAEDIC SURGERY

## 2024-09-29 PROCEDURE — 25000003 PHARM REV CODE 250

## 2024-09-29 PROCEDURE — 63600175 PHARM REV CODE 636 W HCPCS

## 2024-09-29 PROCEDURE — 71000033 HC RECOVERY, INTIAL HOUR: Performed by: ORTHOPAEDIC SURGERY

## 2024-09-29 PROCEDURE — 36000710: Performed by: ORTHOPAEDIC SURGERY

## 2024-09-29 PROCEDURE — 63600175 PHARM REV CODE 636 W HCPCS: Performed by: STUDENT IN AN ORGANIZED HEALTH CARE EDUCATION/TRAINING PROGRAM

## 2024-09-29 PROCEDURE — 25000003 PHARM REV CODE 250: Performed by: HOSPITALIST

## 2024-09-29 PROCEDURE — 37000008 HC ANESTHESIA 1ST 15 MINUTES: Performed by: ORTHOPAEDIC SURGERY

## 2024-09-29 PROCEDURE — 71000039 HC RECOVERY, EACH ADD'L HOUR: Performed by: ORTHOPAEDIC SURGERY

## 2024-09-29 PROCEDURE — 63600175 PHARM REV CODE 636 W HCPCS: Performed by: HOSPITALIST

## 2024-09-29 PROCEDURE — 84100 ASSAY OF PHOSPHORUS: CPT | Performed by: HOSPITALIST

## 2024-09-29 PROCEDURE — 21400001 HC TELEMETRY ROOM

## 2024-09-29 PROCEDURE — 83735 ASSAY OF MAGNESIUM: CPT | Performed by: HOSPITALIST

## 2024-09-29 PROCEDURE — 0QS636Z REPOSITION RIGHT UPPER FEMUR WITH INTRAMEDULLARY INTERNAL FIXATION DEVICE, PERCUTANEOUS APPROACH: ICD-10-PCS | Performed by: ORTHOPAEDIC SURGERY

## 2024-09-29 PROCEDURE — C1769 GUIDE WIRE: HCPCS | Performed by: ORTHOPAEDIC SURGERY

## 2024-09-29 PROCEDURE — 85025 COMPLETE CBC W/AUTO DIFF WBC: CPT | Performed by: HOSPITALIST

## 2024-09-29 PROCEDURE — 64448 NJX AA&/STRD FEM NRV NFS IMG: CPT

## 2024-09-29 PROCEDURE — C1751 CATH, INF, PER/CENT/MIDLINE: HCPCS | Performed by: ANESTHESIOLOGY

## 2024-09-29 PROCEDURE — 27245 TREAT THIGH FRACTURE: CPT | Mod: RT,GC,, | Performed by: ORTHOPAEDIC SURGERY

## 2024-09-29 PROCEDURE — 27201423 OPTIME MED/SURG SUP & DEVICES STERILE SUPPLY: Performed by: ORTHOPAEDIC SURGERY

## 2024-09-29 PROCEDURE — 63600175 PHARM REV CODE 636 W HCPCS: Mod: JZ,JG | Performed by: STUDENT IN AN ORGANIZED HEALTH CARE EDUCATION/TRAINING PROGRAM

## 2024-09-29 PROCEDURE — 71000015 HC POSTOP RECOV 1ST HR: Performed by: ORTHOPAEDIC SURGERY

## 2024-09-29 DEVICE — WIRE KIRSCHNER T2 3X285MM SS: Type: IMPLANTABLE DEVICE | Site: FEMUR | Status: FUNCTIONAL

## 2024-09-29 DEVICE — SCREW LOCKING BONE T2 5X40MM: Type: IMPLANTABLE DEVICE | Site: FEMUR | Status: FUNCTIONAL

## 2024-09-29 DEVICE — GUIDE WIRE 3.0X1000MM BALL TIP: Type: IMPLANTABLE DEVICE | Site: FEMUR | Status: FUNCTIONAL

## 2024-09-29 DEVICE — PIN PRECISION 3.2-3.9X450MM: Type: IMPLANTABLE DEVICE | Site: FEMUR | Status: FUNCTIONAL

## 2024-09-29 RX ORDER — PHENYLEPHRINE HYDROCHLORIDE 10 MG/ML
INJECTION INTRAVENOUS
Status: DISCONTINUED | OUTPATIENT
Start: 2024-09-29 | End: 2024-09-29

## 2024-09-29 RX ORDER — GLUCAGON 1 MG
1 KIT INJECTION
Status: DISCONTINUED | OUTPATIENT
Start: 2024-09-29 | End: 2024-10-02 | Stop reason: HOSPADM

## 2024-09-29 RX ORDER — MUPIROCIN 20 MG/G
1 OINTMENT TOPICAL 2 TIMES DAILY
Status: DISCONTINUED | OUTPATIENT
Start: 2024-09-29 | End: 2024-10-02 | Stop reason: HOSPADM

## 2024-09-29 RX ORDER — PROPOFOL 10 MG/ML
VIAL (ML) INTRAVENOUS
Status: DISCONTINUED | OUTPATIENT
Start: 2024-09-29 | End: 2024-09-29

## 2024-09-29 RX ORDER — CHOLECALCIFEROL (VITAMIN D3) 25 MCG
1000 TABLET ORAL DAILY
Status: DISCONTINUED | OUTPATIENT
Start: 2024-09-29 | End: 2024-09-29

## 2024-09-29 RX ORDER — ACETAMINOPHEN 10 MG/ML
INJECTION, SOLUTION INTRAVENOUS
Status: DISCONTINUED | OUTPATIENT
Start: 2024-09-29 | End: 2024-09-29

## 2024-09-29 RX ORDER — ACETAMINOPHEN 500 MG
1000 TABLET ORAL EVERY 6 HOURS
Status: DISPENSED | OUTPATIENT
Start: 2024-09-29 | End: 2024-10-01

## 2024-09-29 RX ORDER — ROPIVACAINE HYDROCHLORIDE 5 MG/ML
INJECTION, SOLUTION EPIDURAL; INFILTRATION; PERINEURAL
Status: COMPLETED | OUTPATIENT
Start: 2024-09-29 | End: 2024-09-29

## 2024-09-29 RX ORDER — EPHEDRINE SULFATE 50 MG/ML
INJECTION, SOLUTION INTRAVENOUS
Status: DISCONTINUED | OUTPATIENT
Start: 2024-09-29 | End: 2024-09-29

## 2024-09-29 RX ORDER — SODIUM CHLORIDE 9 MG/ML
INJECTION, SOLUTION INTRAVENOUS CONTINUOUS
Status: DISCONTINUED | OUTPATIENT
Start: 2024-09-29 | End: 2024-10-01

## 2024-09-29 RX ORDER — ONDANSETRON HYDROCHLORIDE 2 MG/ML
INJECTION, SOLUTION INTRAVENOUS
Status: DISCONTINUED | OUTPATIENT
Start: 2024-09-29 | End: 2024-09-29

## 2024-09-29 RX ORDER — FENTANYL CITRATE 50 UG/ML
INJECTION, SOLUTION INTRAMUSCULAR; INTRAVENOUS
Status: DISCONTINUED | OUTPATIENT
Start: 2024-09-29 | End: 2024-09-29

## 2024-09-29 RX ORDER — TRANEXAMIC ACID 100 MG/ML
INJECTION, SOLUTION INTRAVENOUS
Status: DISCONTINUED | OUTPATIENT
Start: 2024-09-29 | End: 2024-09-29

## 2024-09-29 RX ORDER — LIDOCAINE HYDROCHLORIDE 20 MG/ML
INJECTION INTRAVENOUS
Status: DISCONTINUED | OUTPATIENT
Start: 2024-09-29 | End: 2024-09-29

## 2024-09-29 RX ORDER — ROPIVACAINE HYDROCHLORIDE 2 MG/ML
INJECTION, SOLUTION EPIDURAL; INFILTRATION; PERINEURAL CONTINUOUS
Status: DISCONTINUED | OUTPATIENT
Start: 2024-09-29 | End: 2024-10-02

## 2024-09-29 RX ORDER — FLUORESCEIN SODIUM AND BENOXINATE HYDROCHLORIDE 2.6; 4.4 MG/ML; MG/ML
1 SOLUTION/ DROPS OPHTHALMIC ONCE
Status: COMPLETED | OUTPATIENT
Start: 2024-09-29 | End: 2024-09-29

## 2024-09-29 RX ORDER — SODIUM CHLORIDE 0.9 % (FLUSH) 0.9 %
10 SYRINGE (ML) INJECTION
Status: DISCONTINUED | OUTPATIENT
Start: 2024-09-29 | End: 2024-10-02 | Stop reason: HOSPADM

## 2024-09-29 RX ORDER — CIPROFLOXACIN HYDROCHLORIDE 3 MG/ML
2 SOLUTION/ DROPS OPHTHALMIC
Status: DISCONTINUED | OUTPATIENT
Start: 2024-09-29 | End: 2024-10-02 | Stop reason: HOSPADM

## 2024-09-29 RX ORDER — CHOLECALCIFEROL (VITAMIN D3) 25 MCG
1000 TABLET ORAL DAILY
Status: DISCONTINUED | OUTPATIENT
Start: 2024-09-29 | End: 2024-10-02 | Stop reason: HOSPADM

## 2024-09-29 RX ORDER — DEXAMETHASONE SODIUM PHOSPHATE 4 MG/ML
INJECTION, SOLUTION INTRA-ARTICULAR; INTRALESIONAL; INTRAMUSCULAR; INTRAVENOUS; SOFT TISSUE
Status: DISCONTINUED | OUTPATIENT
Start: 2024-09-29 | End: 2024-09-29

## 2024-09-29 RX ORDER — ROCURONIUM BROMIDE 10 MG/ML
INJECTION, SOLUTION INTRAVENOUS
Status: DISCONTINUED | OUTPATIENT
Start: 2024-09-29 | End: 2024-09-29

## 2024-09-29 RX ORDER — FENTANYL CITRATE 50 UG/ML
25 INJECTION, SOLUTION INTRAMUSCULAR; INTRAVENOUS EVERY 5 MIN PRN
Status: DISCONTINUED | OUTPATIENT
Start: 2024-09-29 | End: 2024-10-02

## 2024-09-29 RX ORDER — FENTANYL CITRATE 50 UG/ML
25-200 INJECTION, SOLUTION INTRAMUSCULAR; INTRAVENOUS
OUTPATIENT
Start: 2024-09-29

## 2024-09-29 RX ORDER — MUPIROCIN 20 MG/G
OINTMENT TOPICAL
Status: DISCONTINUED | OUTPATIENT
Start: 2024-09-29 | End: 2024-10-02 | Stop reason: HOSPADM

## 2024-09-29 RX ADMIN — MUPIROCIN 1 G: 20 OINTMENT TOPICAL at 08:09

## 2024-09-29 RX ADMIN — OXYCODONE HYDROCHLORIDE 10 MG: 5 TABLET ORAL at 08:09

## 2024-09-29 RX ADMIN — Medication 6 MG: at 08:09

## 2024-09-29 RX ADMIN — PREGABALIN 75 MG: 75 CAPSULE ORAL at 08:09

## 2024-09-29 RX ADMIN — EPHEDRINE SULFATE 10 MG: 50 INJECTION INTRAVENOUS at 08:09

## 2024-09-29 RX ADMIN — ROPIVACAINE HYDROCHLORIDE 10 ML: 5 INJECTION EPIDURAL; INFILTRATION; PERINEURAL at 07:09

## 2024-09-29 RX ADMIN — ACETAMINOPHEN 1000 MG: 10 INJECTION INTRAVENOUS at 08:09

## 2024-09-29 RX ADMIN — ACETAMINOPHEN 1000 MG: 500 TABLET ORAL at 01:09

## 2024-09-29 RX ADMIN — LIDOCAINE HYDROCHLORIDE 100 MG: 20 INJECTION INTRAVENOUS at 08:09

## 2024-09-29 RX ADMIN — ACETAMINOPHEN 1000 MG: 500 TABLET ORAL at 06:09

## 2024-09-29 RX ADMIN — SODIUM CHLORIDE: 0.9 INJECTION, SOLUTION INTRAVENOUS at 07:09

## 2024-09-29 RX ADMIN — DEXAMETHASONE SODIUM PHOSPHATE 4 MG: 4 INJECTION, SOLUTION INTRAMUSCULAR; INTRAVENOUS at 08:09

## 2024-09-29 RX ADMIN — FENTANYL CITRATE 25 MCG: 50 INJECTION, SOLUTION INTRAMUSCULAR; INTRAVENOUS at 08:09

## 2024-09-29 RX ADMIN — APIXABAN 2.5 MG: 2.5 TABLET, FILM COATED ORAL at 08:09

## 2024-09-29 RX ADMIN — TRANEXAMIC ACID 1000 MG: 100 INJECTION, SOLUTION INTRAVENOUS at 08:09

## 2024-09-29 RX ADMIN — MUPIROCIN: 20 OINTMENT TOPICAL at 06:09

## 2024-09-29 RX ADMIN — SODIUM CHLORIDE: 9 INJECTION, SOLUTION INTRAVENOUS at 04:09

## 2024-09-29 RX ADMIN — PROPOFOL 100 MG: 10 INJECTION, EMULSION INTRAVENOUS at 08:09

## 2024-09-29 RX ADMIN — FLUORESCEIN SODIUM AND BENOXINATE HYDROCHLORIDE 1 DROP: 2.6; 4.4 SOLUTION/ DROPS OPHTHALMIC at 06:09

## 2024-09-29 RX ADMIN — SUGAMMADEX 200 MG: 100 INJECTION, SOLUTION INTRAVENOUS at 09:09

## 2024-09-29 RX ADMIN — TRANEXAMIC ACID 1000 MG: 100 INJECTION, SOLUTION INTRAVENOUS at 09:09

## 2024-09-29 RX ADMIN — ONDANSETRON 4 MG: 2 INJECTION INTRAMUSCULAR; INTRAVENOUS at 09:09

## 2024-09-29 RX ADMIN — ROCURONIUM BROMIDE 50 MG: 10 INJECTION, SOLUTION INTRAVENOUS at 08:09

## 2024-09-29 RX ADMIN — SODIUM CHLORIDE: 9 INJECTION, SOLUTION INTRAVENOUS at 10:09

## 2024-09-29 RX ADMIN — PHENYLEPHRINE HYDROCHLORIDE 0.2 MCG/KG/MIN: 10 INJECTION INTRAVENOUS at 08:09

## 2024-09-29 RX ADMIN — CEFAZOLIN 2 G: 2 INJECTION, POWDER, FOR SOLUTION INTRAMUSCULAR; INTRAVENOUS at 04:09

## 2024-09-29 RX ADMIN — CEFAZOLIN 2 G: 2 INJECTION, POWDER, FOR SOLUTION INTRAMUSCULAR; INTRAVENOUS at 08:09

## 2024-09-29 RX ADMIN — HYPROMELLOSE 2910 1 DROP: 5 SOLUTION/ DROPS OPHTHALMIC at 03:09

## 2024-09-29 RX ADMIN — HYPROMELLOSE 2910 1 DROP: 5 SOLUTION/ DROPS OPHTHALMIC at 08:09

## 2024-09-29 RX ADMIN — PHENYLEPHRINE HYDROCHLORIDE 100 MCG: 10 INJECTION INTRAVENOUS at 08:09

## 2024-09-29 RX ADMIN — Medication: at 10:09

## 2024-09-29 NOTE — SUBJECTIVE & OBJECTIVE
Interval history- seen in pacu with nursing at bedside after IM nail today with ortho. She has severe dementia at baseline (notes from  the last 2 weeks report was disoriented, did not answer questions, was unaware of diarrhea she was having, etc) so baseline with alertness but not oriented and similar to notes on admit last night. Was on BP regimen in 2022 with norvasc, coreg, hctz but not on ercently. BP very high in ER on admit 180s-200s so norvasc started with improved BP, 140s in pacu now. Hydralazine PRn added and if spikes back up then would consider resuming other ones she's been on before. Was at [facility] a day after at  for placement due to dementia. Daughter is caretaker per ortho. Will trial PT/OT tomorrow to see best options for post acute care with dementia.     No current facility-administered medications on file prior to encounter.     Current Outpatient Medications on File Prior to Encounter   Medication Sig    amLODIPine (NORVASC) 2.5 MG tablet Take 1 tablet by mouth once daily.    aspirin 81 MG Chew Take 81 mg by mouth once daily.    EScitalopram oxalate (LEXAPRO) 10 MG tablet Take 1 tablet by mouth once daily.    memantine (NAMENDA) 5 MG Tab Take 1 tablet by mouth once daily.     Family History    None       Tobacco Use    Smoking status: Never    Smokeless tobacco: Never   Substance and Sexual Activity    Alcohol use: Not on file    Drug use: Not on file    Sexual activity: Not on file     Review of Systems   Unable to perform ROS: Dementia     Objective:     Vital Signs (Most Recent):  Temp: 97.9 °F (36.6 °C) (09/29/24 0957)  Pulse: 63 (09/29/24 1100)  Resp: 13 (09/29/24 1100)  BP: (!) 119/58 (09/29/24 1100)  SpO2: 97 % (09/29/24 1100) Vital Signs (24h Range):  Temp:  [97.4 °F (36.3 °C)-98.5 °F (36.9 °C)] 97.9 °F (36.6 °C)  Pulse:  [63-80] 63  Resp:  [12-25] 13  SpO2:  [92 %-100 %] 97 %  BP: (110-225)/() 119/58     Weight: 61.2 kg (134 lb 14.7 oz)  Body mass index is 23.9 kg/m².      Physical Exam  Vitals reviewed.   Constitutional:       General: She is not in acute distress.     Appearance: She is well-developed.      Comments: Sleepy in pacu, opens eyes briefly but does not answer questions to myself or nursing in pacu   HENT:      Head: Normocephalic and atraumatic.   Eyes:      Extraocular Movements: Extraocular movements intact.      Pupils: Pupils are equal, round, and reactive to light.   Neck:      Vascular: No JVD.      Trachea: No tracheal deviation.   Cardiovascular:      Rate and Rhythm: Normal rate and regular rhythm.      Heart sounds: Murmur heard.      No friction rub. No gallop.   Pulmonary:      Effort: No respiratory distress.      Breath sounds: Normal breath sounds. No wheezing or rales.   Abdominal:      General: Bowel sounds are normal. There is no distension.      Palpations: Abdomen is soft. There is no mass.      Tenderness: There is no abdominal tenderness.   Musculoskeletal:      Cervical back: Neck supple.      Comments: Bandages to RLE.   Lymphadenopathy:      Cervical: No cervical adenopathy.   Skin:     General: Skin is warm and dry.      Findings: No rash.   Neurological:      Mental Status: She is alert. Mental status is at baseline. She is disoriented.              CRANIAL NERVES     CN III, IV, VI   Pupils are equal, round, and reactive to light.       Significant Labs: All pertinent labs within the past 24 hours have been reviewed.    Significant Imaging: I have reviewed all pertinent imaging results/findings within the past 24 hours.

## 2024-09-29 NOTE — ASSESSMENT & PLAN NOTE
"-Pt. Alert to person only, recently placed in NH one day prior to fall. CT Head at [facility] 9/18 "Diffuse cerebral volume loss with medial temporal lobe predilection in a pattern concerning for underlying Alzheimer's dementia"  -Delirium precautions, continue home namenda  "

## 2024-09-29 NOTE — TRANSFER OF CARE
"Anesthesia Transfer of Care Note    Patient: Toña Carrero    Procedure(s) Performed: Procedure(s) (LRB):  INSERTION, INTRAMEDULLARY NORMA, FEMUR - right, earlfarooqa table (Right)    Patient location: PACU    Anesthesia Type: general    Transport from OR: Transported from OR on 6-10 L/min O2 by face mask with adequate spontaneous ventilation    Post pain: adequate analgesia    Post assessment: no apparent anesthetic complications and tolerated procedure well    Post vital signs: stable    Level of consciousness: responds to stimulation    Nausea/Vomiting: no nausea/vomiting    Complications: none    Transfer of care protocol was followed      Last vitals: Visit Vitals  BP (!) 142/69 (Patient Position: Lying)   Pulse 63   Temp 36.3 °C (97.4 °F) (Oral)   Resp 16   Ht 5' 3" (1.6 m)   Wt 61.2 kg (134 lb 14.7 oz)   SpO2 95%   Breastfeeding No   BMI 23.90 kg/m²     "

## 2024-09-29 NOTE — CARE UPDATE
"RAPID RESPONSE NURSE CHART REVIEW        Chart Reviewed: 09/28/2024, 11:24 PM    MRN: [id number]  Bed: 553/553 A    Dx: Displaced intertrochanteric fracture of right femur, initial encounter for closed fracture    Toña Carrero has no past medical history on file.    Last VS: BP (!) 190/101   Pulse 73   Temp 97.8 °F (36.6 °C) (Tympanic)   Resp 20   Ht 5' 3" (1.6 m)   Wt 61.2 kg (135 lb)   SpO2 (!) 94%   Breastfeeding No   BMI 23.91 kg/m²     24H Vital Sign Range:  Temp:  [97.8 °F (36.6 °C)-98.5 °F (36.9 °C)]   Pulse:  [64-80]   Resp:  [16-22]   BP: (166-225)/()   SpO2:  [93 %-95 %]     Level of Consciousness (AVPU): alert    Recent Labs     09/28/24  1610   WBC 11.14   HGB 12.4   HCT 38.0          Recent Labs     09/28/24  1610   *   K 3.9      CO2 19*   BUN 16   CREATININE 0.8      PHOS 3.1   MG 1.7        No results for input(s): "PH", "PCO2", "PO2", "HCO3", "POCSATURATED", "BE" in the last 72 hours.     OXYGEN:             MEWS score: 1    Rounding completed with charge LIVIA Breen reports persistent HTN in ED; suspected to be pain related. Will attempt to obtain adequate pain control once pt arrives to the unit. No additional concerns verbalized at this time. Instructed to call [phone] for further concerns or assistance it pt remains hypertensive.     Nidia Nugent RN          "

## 2024-09-29 NOTE — PROGRESS NOTES
[facility]  Orthopedics  Progress Note    Patient Name: Toña Carrero  MRN: [id number]  Admission Date: 9/28/2024  Hospital Length of Stay: 1 days  Attending Provider: Amie Lindsay MD  Primary Care Provider: No primary care provider on file.  Follow-up For: Procedure(s) (LRB):  INSERTION, INTRAMEDULLARY NORMA, FEMUR - right, earl, hana table (Right)    Post-Operative Day: Day of Surgery  Subjective:     Principal Problem:Displaced intertrochanteric fracture of right femur, initial encounter for closed fracture    Principal Orthopedic Problem: same as above    Interval History: NAEO. VSS. Plan for OR today. Hgb 11.7.    Review of patient's allergies indicates:  No Known Allergies    Current Facility-Administered Medications   Medication    0.9%  NaCl infusion (for blood administration)    0.9%  NaCl infusion    acetaminophen tablet 1,000 mg    amLODIPine tablet 5 mg    bisacodyL suppository 10 mg    ceFAZolin 2 g in D5W 50 mL IVPB (MB+)    EScitalopram oxalate tablet 10 mg    hydrALAZINE tablet 50 mg    LIDOcaine (PF) 10 mg/ml (1%) injection 10 mg    melatonin tablet 6 mg    memantine tablet 5 mg    morphine injection 2 mg    mupirocin 2 % ointment 1 g    mupirocin 2 % ointment    ondansetron injection 4 mg    oxyCODONE immediate release tablet 10 mg    oxyCODONE immediate release tablet 5 mg    pregabalin capsule 75 mg    ropivacaine 0.2% Perineural Pump infusion 500 ML    sodium chloride 0.9% flush 10 mL    sodium chloride 0.9% flush 10 mL    tranexamic acid (CYKLOKAPRON) 1,000 mg in 0.9% NaCl 100 mL IVPB (MB+)    tranexamic acid (CYKLOKAPRON) 3,000 mg in 0.9% NaCl SolP 100 mL    vitamin D 1000 units tablet 1,000 Units     Objective:     Vital Signs (Most Recent):  Temp: 97.4 °F (36.3 °C) (09/29/24 0449)  Pulse: 63 (09/29/24 0449)  Resp: 16 (09/29/24 0632)  BP: (!) 142/69 (09/29/24 0449)  SpO2: 95 % (09/29/24 0449) Vital Signs (24h Range):  Temp:  [97.4 °F (36.3 °C)-98.5 °F (36.9 °C)] 97.4 °F (36.3  "°C)  Pulse:  [63-80] 63  Resp:  [16-22] 16  SpO2:  [93 %-95 %] 95 %  BP: (114-225)/() 142/69     Weight: 61.2 kg (134 lb 14.7 oz)  Height: 5' 3" (160 cm)  Body mass index is 23.9 kg/m².      Intake/Output Summary (Last 24 hours) at 9/29/2024 0723  Last data filed at 9/28/2024 2300  Gross per 24 hour   Intake --   Output 350 ml   Net -350 ml        Ortho/SPM Exam    RLE:  Inspection: Shortened with mod swelling over lateral hip  Skin intact throughout, no open wounds  No ecchymosis    Palpation: No bony TTP throughout  Pain with log roll   Compartments soft and compressible.   ROM: Painless ROM ankle    Neuro: TA/Gastroc/EHL/FHL assessed in isolation without deficit.   SILT Sa/[place]/DP/SP/T nerve distributions   Vascular: DP artery palpated 2+. Capillary refill <3s.           Significant Labs: All pertinent labs within the past 24 hours have been reviewed.    Significant Imaging: I have reviewed and interpreted all pertinent imaging results/findings.  Assessment/Plan:     * Displaced intertrochanteric fracture of right femur, initial encounter for closed fracture  Toña Carrero is a 79 y.o. female with Right IT fracture, closed, NVI. They take no anticoagulation at home. They required walker** ambulatory assistive devices prior to this injury.     -Admitted to medicine hip fracture service for pre-operative clearance and medical evaluation  -To OR 9/29 for operative fixation of right intertroch fracture  -Pt marked, booked, and consented for surgery  -DVT PPx: Hold anticoagulation  -Abx: Preop abx ordered  -Labs: Prealbumin 15, UA pending, Glucose 104, Albumin 3.4, Hemoglobin 11.7, A1c 5.2, INR 1.0. Other lab results pending.  -Bed rest, felder, NPO at midnight  -Iv: ordered for contralateral arm    Patient was explained in detail the severity of the injury that was suffered. Patient was explained the risks/benefits/and alternatives to operative management in detail including infection, bleeding, pain, nerve and " vascular damage, heterotopic ossification, leg length discrepancies, rotational deformities and they express full understanding.  We discussed the 30% national first year mortality rate with hip fractures, the need for early mobilization, and the expected rehab course.  She and her daughter expresses full understanding of the condition and expresses that they want to proceed with surgery. The patient is admitted to the medicine hip fracture service for optimization of medical comorbidities. Will plan for OR tomorrow. No guarantees were made, informed consent was obtained. All questions were answered to patient's and family's satisfaction.               MELANIE Arvizu MD  Orthopedics  [facility] - Surgery

## 2024-09-29 NOTE — ASSESSMENT & PLAN NOTE
-Orthopedic surgery consulted and plan to take patient to the OR tomorrow. Pt. NPO at midnight for surgical repair of hip fracture  -Pain control as per Hip Fracture Pathway with multimodal pain regimen with scheduled Tylenol and Lyrica 75 mg po nightly. Robaxin, oxycodone PRN. IV morphine PRN for pain not controlled by PO medications  -DVT prophylaxis pre-op with TEDs/SCDs.   -Check Vitamin D level to assess for Vitamin D deficiency due to concern for osteoporotic fracture.   -Plan to monitor daily electrolytes and H/H post-op.   -Start Lovenox 40 mg subcutaneous daily post-op after surgery for DVT prophylaxis and will need for a total of 28 days after hip fracture surgery  -Consult PT/OT post-op for gait training and strengthening and restoration of ADLs.   -Consult  and case management to assist with discharge planning for this patient after surgery.        Preoperative Cardiac evaluation  Cardiovascular Risk Assessment:  Non-emergent surgery.  No active cardiac problems (such as unstable angina, decompensated heart failure, significant uncontrolled arrhythmias or severe valvular disease).  Intermediate risk surgery.  Functional Status: SHE IS NOT able to climb a flight of stairs (> 4 METS) with no CP or SOB  Her revised cardiac risk index is 1 (chronic cerebrovascular disease).     1 pt Each: Ischemic Heart Disease, Cerebrovascular Disease,                     CHF, DM, Creatinine > 2           Other Issues: Advanced dementia limits rehab potential. She has no acute decompensated issues, however. Ok to proceed with surgery without further cardiac testing

## 2024-09-29 NOTE — ASSESSMENT & PLAN NOTE
-Pt. Not taking medications prior to recent admission, had previously been prescribed amlodipine. Restart previously prescribed med amlodipine at 5 mg. Monitor and titrate meds as needed

## 2024-09-29 NOTE — ED NOTES
Nurse reports tied up with patient care and to call back momentarily. Will call back in 15 minutes.

## 2024-09-29 NOTE — PLAN OF CARE
Pt orientated to self with a very high BP of 190/101. PT was in pain after being moved so morphine was given to see if that will help with their BP. MD came to room to speak with the pt and said they would prescribe PO hydralazine for them. Pt currently asleep, pt care ongoing.  Problem: Skin Injury Risk Increased  Goal: Skin Health and Integrity  Outcome: Progressing     Problem: Infection  Goal: Absence of Infection Signs and Symptoms  Outcome: Progressing     Problem: Adult Inpatient Plan of Care  Goal: Patient-Specific Goal (Individualized)  Outcome: Progressing  Goal: Absence of Hospital-Acquired Illness or Injury  Outcome: Progressing  Goal: Optimal Comfort and Wellbeing  Outcome: Progressing

## 2024-09-29 NOTE — H&P
[facility] Medicine  History & Physical    Patient Name: Toña Carrero  MRN: [id number]  Patient Class: IP- Inpatient  Admission Date: 9/28/2024  Attending Physician: Amie Lindsay   Primary Care Provider: No primary care provider on file.         Patient information was obtained from patient, past medical records, and ER records.     Subjective:     Principal Problem:Displaced intertrochanteric fracture of right femur, initial encounter for closed fracture    Chief Complaint:   Chief Complaint   Patient presents with    Hip Pain    Hip Injury     Broken hip right  Shortening and rotation, bonded by EMS    Fall        HPI: 78 yo F with PMHx advanced dementia and HTN who presents to the ED from the [facility] after a fall today with noted R hip deformity. Pt. Was recently admitted to [facility] 9/18-9/27 for worsening confusion and debility which was ultimately determined to be related to progressive dementia. Pt.  reportedly also needed placement as they both have advanced dementia and unable to care for themselves. She was discharged yesterday and placed at the [facility] assisted living [facility]. Today, paient reportedly had a fall in the dining room at the facility. She had noted deformity, EMS was contacted and reportedly wrapped pt. Pelvis in a sheet because of stability concerns. In ED, X-ray obtained revealed an impacted intertrochanteric fracture of the right femur.    History reviewed. No pertinent past medical history.    History reviewed. No pertinent surgical history.    Review of patient's allergies indicates:  No Known Allergies    No current facility-administered medications on file prior to encounter.     Current Outpatient Medications on File Prior to Encounter   Medication Sig    amLODIPine (NORVASC) 2.5 MG tablet Take 1 tablet by mouth once daily.    aspirin 81 MG Chew Take 81 mg by mouth once daily.    EScitalopram oxalate (LEXAPRO) 10 MG tablet Take 1 tablet by  mouth once daily.    memantine (NAMENDA) 5 MG Tab Take 1 tablet by mouth once daily.     Family History    None       Tobacco Use    Smoking status: Never    Smokeless tobacco: Never   Substance and Sexual Activity    Alcohol use: Not on file    Drug use: Not on file    Sexual activity: Not on file     Review of Systems   Unable to perform ROS: Dementia     Objective:     Vital Signs (Most Recent):  Temp: 98.3 °F (36.8 °C) (09/28/24 1700)  Pulse: 80 (09/28/24 2130)  Resp: 20 (09/28/24 2130)  BP: (!) 166/64 (09/28/24 2130)  SpO2: 95 % (09/28/24 2130) Vital Signs (24h Range):  Temp:  [98.3 °F (36.8 °C)-98.5 °F (36.9 °C)] 98.3 °F (36.8 °C)  Pulse:  [64-80] 80  Resp:  [16-22] 20  SpO2:  [93 %-95 %] 95 %  BP: (166-225)/(64-91) 166/64     Weight: 61.2 kg (135 lb)  Body mass index is 23.91 kg/m².     Physical Exam  Vitals reviewed.   Constitutional:       General: She is not in acute distress.     Appearance: She is well-developed.   HENT:      Head: Normocephalic and atraumatic.   Eyes:      Extraocular Movements: Extraocular movements intact.      Pupils: Pupils are equal, round, and reactive to light.   Neck:      Vascular: No JVD.      Trachea: No tracheal deviation.   Cardiovascular:      Rate and Rhythm: Normal rate and regular rhythm.      Heart sounds: Murmur heard.      No friction rub. No gallop.   Pulmonary:      Effort: No respiratory distress.      Breath sounds: Normal breath sounds. No wheezing or rales.   Abdominal:      General: Bowel sounds are normal. There is no distension.      Palpations: Abdomen is soft. There is no mass.      Tenderness: There is no abdominal tenderness.   Musculoskeletal:         General: Swelling, tenderness and deformity present.      Cervical back: Neck supple.   Lymphadenopathy:      Cervical: No cervical adenopathy.   Skin:     General: Skin is warm and dry.      Findings: No rash.   Neurological:      Mental Status: She is alert. Mental status is at baseline. She is  disoriented.              CRANIAL NERVES     CN III, IV, VI   Pupils are equal, round, and reactive to light.       Significant Labs: All pertinent labs within the past 24 hours have been reviewed.    Significant Imaging: I have reviewed all pertinent imaging results/findings within the past 24 hours.  Assessment/Plan:     * Displaced intertrochanteric fracture of right femur, initial encounter for closed fracture  -Orthopedic surgery consulted and plan to take patient to the OR tomorrow. Pt. NPO at midnight for surgical repair of hip fracture  -Pain control as per Hip Fracture Pathway with multimodal pain regimen with scheduled Tylenol and Lyrica 75 mg po nightly. Robaxin, oxycodone PRN. IV morphine PRN for pain not controlled by PO medications  -DVT prophylaxis pre-op with TEDs/SCDs.   -Check Vitamin D level to assess for Vitamin D deficiency due to concern for osteoporotic fracture.   -Plan to monitor daily electrolytes and H/H post-op.   -Start Lovenox 40 mg subcutaneous daily post-op after surgery for DVT prophylaxis and will need for a total of 28 days after hip fracture surgery  -Consult PT/OT post-op for gait training and strengthening and restoration of ADLs.   -Consult  and case management to assist with discharge planning for this patient after surgery.        Preoperative Cardiac evaluation  Cardiovascular Risk Assessment:  Non-emergent surgery.  No active cardiac problems (such as unstable angina, decompensated heart failure, significant uncontrolled arrhythmias or severe valvular disease).  Intermediate risk surgery.  Functional Status: SHE IS NOT able to climb a flight of stairs (> 4 METS) with no CP or SOB  Her revised cardiac risk index is 1 (chronic cerebrovascular disease).     1 pt Each: Ischemic Heart Disease, Cerebrovascular Disease,                     CHF, DM, Creatinine > 2           Other Issues: Advanced dementia limits rehab potential. She has no acute decompensated issues,  "however. Ok to proceed with surgery without further cardiac testing        HTN (hypertension)  -Pt. Not taking medications prior to recent admission, had previously been prescribed amlodipine. Restart previously prescribed med amlodipine at 5 mg. Monitor and titrate meds as needed    Advanced dementia  -Pt. Alert to person only, recently placed in NH one day prior to fall. CT Head at [facility] 9/18 "Diffuse cerebral volume loss with medial temporal lobe predilection in a pattern concerning for underlying Alzheimer's dementia"  -Delirium precautions, continue home namenda      VTE Risk Mitigation (From admission, onward)           Ordered     IP VTE HIGH RISK PATIENT  Once         09/28/24 2132     Place sequential compression device  Until discontinued         09/28/24 2132                                    Ryne Hair MD  Department of Hospital Medicine  [facility] - Surgery          "

## 2024-09-29 NOTE — HPI
Toña Carrero is a 79 y.o. female with PMH of dementia, mitral valve prolapse, CAD, HTN presenting with right hip pain. Patient fell onto her right hip today. Immediate pain and difficulty bearing weight. Denies head injury or LOC. She is not on. Denies other MSK pains. Denies numbness, tingling, or paresthesias to the RLE. Lives at assisted living facility. Uses walker** assistive device for ambulation.

## 2024-09-29 NOTE — ASSESSMENT & PLAN NOTE
Toña Carrero is a 79 y.o. female with Right IT fracture, closed, NVI. They take no anticoagulation at home. They required walker** ambulatory assistive devices prior to this injury.     -Admitted to medicine hip fracture service for pre-operative clearance and medical evaluation  -To OR 9/29 for operative fixation of right intertroch fracture  -Pt marked, booked, and consented for surgery  -DVT PPx: Hold anticoagulation  -Abx: Preop abx ordered  -Labs: Prealbumin 15, UA pending, Glucose 104, Albumin 3.4, Hemoglobin 12.4, A1c 5.2, INR 1.0. Other lab results pending.  -Bed rest, felder, NPO at midnight  -Iv: ordered for contralateral arm    Patient was explained in detail the severity of the injury that was suffered. Patient was explained the risks/benefits/and alternatives to operative management in detail including infection, bleeding, pain, nerve and vascular damage, heterotopic ossification, leg length discrepancies, rotational deformities and they express full understanding.  We discussed the 30% national first year mortality rate with hip fractures, the need for early mobilization, and the expected rehab course.  She and her daughter expresses full understanding of the condition and expresses that they want to proceed with surgery. The patient is admitted to the medicine hip fracture service for optimization of medical comorbidities. Will plan for OR tomorrow. No guarantees were made, informed consent was obtained. All questions were answered to patient's and family's satisfaction.

## 2024-09-29 NOTE — ANESTHESIA PROCEDURE NOTES
Peripheral Block    Patient location during procedure: pre-op   Block not for primary anesthetic.  Reason for block: at surgeon's request and post-op pain management   Post-op Pain Location: right hip pain   Start time: 9/29/2024 7:00 AM  Timeout: 9/29/2024 6:59 AM   End time: 9/29/2024 7:26 AM    Staffing  Authorizing Provider: Dhaval Merchant MD  Performing Provider: Dhaval Merchant MD    Staffing  Other anesthesia staff: Savanna Ortiz MD  Performed by: Dhaval Merchant MD  Authorized by: Dhaval Merchant MD    Preanesthetic Checklist  Completed: patient identified, IV checked, site marked, risks and benefits discussed, surgical consent, monitors and equipment checked, pre-op evaluation and timeout performed  Peripheral Block  Patient position: supine  Prep: ChloraPrep and site prepped and draped  Patient monitoring: heart rate, cardiac monitor, continuous pulse ox, continuous capnometry and frequent blood pressure checks  Block type: fascia iliaca  Laterality: right  Injection technique: continuous  Needle  Needle type: Tuohy   Needle gauge: 17 G  Needle length: 3.5 in  Needle localization: anatomical landmarks and ultrasound guidance  Catheter type: spring wound  Catheter size: 19 G  Test dose: lidocaine 1.5% with Epi 1-to-200,000 and negative   -ultrasound image captured on disc.  Assessment  Injection assessment: negative aspiration, negative parasthesia and local visualized surrounding nerve  Paresthesia pain: none  Heart rate change: no  Slow fractionated injection: yes  Pain Tolerance: comfortable throughout block and no complaints  Medications:    Medications: ropivacaine (NAROPIN) injection 0.5% - Perineural   10 mL - 9/29/2024 7:15:00 AM    Additional Notes  VSS.  [facility] RN monitoring vitals throughout procedure.  Patient tolerated procedure well.

## 2024-09-29 NOTE — ANESTHESIA PREPROCEDURE EVALUATION
[facility]  Anesthesia Pre-Operative Evaluation         Patient Name: Toña Carrero  YOB: 1945  MRN: [id number]    SUBJECTIVE:     Pre-operative evaluation for Procedure(s) (LRB):  INSERTION, INTRAMEDULLARY NORMA, FEMUR - right, earl, farooqa table (Right)     09/28/2024    Toña Carrero is a 79 y.o. female w/ a significant PMHx of advanced dementia, aortic valve stenosis, CAD, HTN, HLD, mild-mod MR currently living at an assisted living facility who presents today with right hip pain after a fall. The patient's daughter explains that she was told that while the patient was in the dining room she was reaching for something and had a fall onto her right side w/ imaging showing closed comminuted intertrochanteric fracture of right femur.    Consent given by daughter Dyana Obando with ED RN witness.     Patient now presents for the above procedure(s).     TTE CONCLUSIONS 2022:    *Normal left ventricular cavity size. Normal left ventricular wall thickness. Normal left ventricular systolic function. Left ventricular      ejection fraction is estimated at >55%. Normal diastolic function.     *There were no obvious regional wall motion abnormalities.     *RVSP ~ 29.8 mmHg.     *Mildly thickened mitral valve. Mitral annular calcification. Mild-moderate MR.     *Thickened trileaflet aortic valve. Trace AI.     *Mild-to-moderate TR.     *Trace PI.       LDA:        Peripheral IV - Single Lumen 09/28/24 1546 20 G Anterior;Left Forearm (Active)   Site Assessment Clean;Dry;Intact 09/28/24 1546   Line Status Blood return noted;Saline locked;Flushed 09/28/24 1546   Number of days: 0            Urethral Catheter 09/28/24 1845 16 Fr. (Active)   Number of days: 0       Prev airway: None documented.    Drips:    0.9% NaCl   Intravenous Continuous           Patient Active Problem List   Diagnosis    Displaced intertrochanteric fracture of right femur, initial encounter for closed fracture    Advanced  dementia    HTN (hypertension)       Review of patient's allergies indicates:  No Known Allergies    Current Inpatient Medications:   acetaminophen  1,000 mg Oral Q8H    pregabalin  75 mg Oral QHS       No current facility-administered medications on file prior to encounter.     Current Outpatient Medications on File Prior to Encounter   Medication Sig Dispense Refill    amLODIPine (NORVASC) 2.5 MG tablet Take 1 tablet by mouth once daily.      aspirin 81 MG Chew Take 81 mg by mouth once daily.      EScitalopram oxalate (LEXAPRO) 10 MG tablet Take 1 tablet by mouth once daily.      memantine (NAMENDA) 5 MG Tab Take 1 tablet by mouth once daily.         History reviewed. No pertinent surgical history.    Social History     Socioeconomic History    Marital status:    Tobacco Use    Smoking status: Never    Smokeless tobacco: Never     Social Drivers of Health     Food Insecurity: No Food Insecurity (9/19/2024)    Received from [facility]    Hunger Vital Sign     Worried About Running Out of Food in the Last Year: Never true     Ran Out of Food in the Last Year: Never true   Transportation Needs: No Transportation Needs (9/19/2024)    Received from [facility]    PRAPARE - Transportation     Lack of Transportation (Medical): No     Lack of Transportation (Non-Medical): No   Housing Stability: Low Risk  (9/19/2024)    Received from [facility]    Housing Stability Vital Sign     Unable to Pay for Housing in the Last Year: No     Number of Times Moved in the Last Year: 1     Homeless in the Last Year: No       OBJECTIVE:     Vital Signs Range (Last 24H):  Temp:  [36.8 °C (98.3 °F)-36.9 °C (98.5 °F)]   Pulse:  [64-80]   Resp:  [16-22]   BP: (166-225)/(64-91)   SpO2:  [93 %-95 %]       Significant Labs:  Lab Results   Component Value Date    WBC 11.14 09/28/2024    HGB 12.4 09/28/2024    HCT 38.0 09/28/2024     09/28/2024    ALT 13 09/28/2024    AST 28 09/28/2024     (L) 09/28/2024    K 3.9 09/28/2024      09/28/2024    CREATININE 0.8 09/28/2024    BUN 16 09/28/2024    CO2 19 (L) 09/28/2024    INR 1.0 09/28/2024    HGBA1C 5.2 09/28/2024       Diagnostic Studies: No relevant studies.    EKG:   No results found for this or any previous visit.    2D ECHO:  TTE:  No results found for this or any previous visit.    JESSICA:  No results found for this or any previous visit.    ASSESSMENT/PLAN:       Pre-op Assessment    I have reviewed the Patient Summary Reports.     I have reviewed the Nursing Notes. I have reviewed the NPO Status.   I have reviewed the Medications.     Review of Systems  Anesthesia Hx:  No problems with previous Anesthesia             Denies Family Hx of Anesthesia complications.    Denies Personal Hx of Anesthesia complications.                    Hematology/Oncology:  Hematology Normal   Oncology Normal                                   EENT/Dental:  EENT/Dental Normal           Cardiovascular:     Hypertension Valvular problems/Murmurs, MR, AS  Denies MI. CAD        Denies CHF.                                 Pulmonary:  Pulmonary Normal   Denies COPD.  Denies Asthma.     Denies Sleep Apnea.                Renal/:  Renal/ Normal                 Hepatic/GI:  Hepatic/GI Normal                 Musculoskeletal:  Musculoskeletal Normal                Neurological:    Denies CVA.             Dementia severe                        Endocrine:  Endocrine Normal Denies Diabetes.           Dermatological:  Skin Normal    Psych:  Psychiatric Normal                    Physical Exam  General: Confusion, Alert and Cooperative  Pleasantly confused  Airway:  Mallampati: II   Mouth Opening: Normal  TM Distance: Normal  Tongue: Normal  Neck ROM: Normal ROM    Dental:  Intact        Anesthesia Plan  Type of Anesthesia, risks & benefits discussed:    Anesthesia Type: Gen ETT, Regional  Intra-op Monitoring Plan: Standard ASA Monitors  Post Op Pain Control Plan: multimodal analgesia and IV/PO Opioids  PRN  Induction:  IV  Airway Plan: Direct, Post-Induction  Informed Consent: Informed consent signed with the Patient representative and all parties understand the risks and agree with anesthesia plan.  All questions answered.   ASA Score: 3  Day of Surgery Review of History & Physical: H&P Update referred to the surgeon/provider.    Ready For Surgery From Anesthesia Perspective.     .

## 2024-09-29 NOTE — ASSESSMENT & PLAN NOTE
-sustained in mechanical fall at the [facility] memory care unit after just admitting there a day prior after being at  for placement previously.  -s/p IM nail with ortho today with wbat with PT/OT tomorrow and will assess best options post op given dementia requriing the dementia unit vs need for thearpy and which needs may be most important post op  -eliquis for 35 days with end date nov 4th  -multimodal pain meds and bowel regimen  -vit D mildly low at 27 and replacement started  -felder out on POD 1

## 2024-09-29 NOTE — ANESTHESIA POSTPROCEDURE EVALUATION
Anesthesia Post Evaluation    Patient: Toña Carrero    Procedure(s) Performed: Procedure(s) (LRB):  INSERTION, INTRAMEDULLARY NORMA, FEMUR - right, earl, hana table (Right)    Final Anesthesia Type: general      Patient location during evaluation: PACU  Patient participation: No - Unable to Participate, Coma/Other Inability to Communicate  Level of consciousness: awake, sedated and confused  Post-procedure vital signs: reviewed and stable  Pain management: adequate  Airway patency: patent    PONV status at discharge: No PONV  Anesthetic complications: yes  Perioperative Events: corneal abrasion        Cardiovascular status: blood pressure returned to baseline  Respiratory status: unassisted  Hydration status: euvolemic  Follow-up not needed.              Vitals Value Taken Time   /61 09/29/24 1147   Temp 36.6 °C (97.9 °F) 09/29/24 0957   Pulse 64 09/29/24 1147   Resp 16 09/29/24 1147   SpO2 92 % 09/29/24 1147         Event Time   Out of Recovery 09/29/2024 11:19:00         Pain/Hermelindo Score: Pain Rating Prior to Med Admin: 0 (9/29/2024 10:34 AM)  Pain Rating Post Med Admin: 0 (pt asleep) (9/29/2024 12:49 AM)  Hermelindo Score: 8 (9/29/2024 11:15 AM)

## 2024-09-29 NOTE — ANESTHESIA RELEASE NOTE
"Anesthesia Release from PACU Note    Patient: Toña Carrero    Procedure(s) Performed: Procedure(s) (LRB):  INSERTION, INTRAMEDULLARY NORMA, FEMUR - right, karlie elliott table (Right)    Anesthesia type: general    Post pain: Adequate analgesia    Post assessment: no apparent anesthetic complications    Last Vitals: Visit Vitals  BP (!) 119/58   Pulse 63   Temp 36.6 °C (97.9 °F) (Temporal)   Resp 13   Ht 5' 3" (1.6 m)   Wt 61.2 kg (134 lb 14.7 oz)   SpO2 97%   Breastfeeding No   BMI 23.90 kg/m²       Post vital signs: stable    Level of consciousness: awake, alert , and confused    Nausea/Vomiting: no nausea/no vomiting    Complications: none    Airway Patency: patent    Respiratory: unassisted, spontaneous ventilation, room air    Cardiovascular: stable and blood pressure at baseline    Hydration: euvolemic  "

## 2024-09-29 NOTE — ASSESSMENT & PLAN NOTE
"-Pt. Alert to person only, recently placed in NH one day prior to fall. CT Head at [facility] 9/18 "Diffuse cerebral volume loss with medial temporal lobe predilection in a pattern concerning for underlying Alzheimer's dementia"  -Delirium precautions, continue home namenda  "

## 2024-09-29 NOTE — BRIEF OP NOTE
[facility] - [facility]  Brief Operative Note    SUMMARY     Surgery Date: 9/29/2024     Surgeons and Role:     * Veronica Jimenez MD - Primary     * RICH Thomas MD - Resident - Assisting     * Ashwini Ovalle MD - Resident - Chief        Pre-op Diagnosis:  Closed comminuted intertrochanteric fracture of right femur, initial encounter [S72.141A]    Post-op Diagnosis:  Post-Op Diagnosis Codes:     * Closed comminuted intertrochanteric fracture of right femur, initial encounter [S72.141A]    Procedure(s) (LRB):  INSERTION, INTRAMEDULLARY NORMA, FEMUR - right, pérez, hana table (Right)    Anesthesia: Choice    Implants:  Implant Name Type Inv. Item Serial No.  Lot No. LRB No. Used Action   Pérez [id number] Long nail     [id number] Right 1 Implanted   PIN PRECISION [id number] - [id number]  PIN PRECISION [id number]  PÉREZ [employer] EDYTA. [id number] Right 1 Implanted   WIRE RASHAUN T2 [id number] SS - [id number]  WIRE RASHAUN T2 [id number] SS  PÉREZ [employer] EDYTA. [id number] Right 1 Implanted   GUIDE WIRE [id number] BALL TIP - [id number]  GUIDE WIRE [id number] BALL TIP  PÉREZ [employer] EDYTA. [id number] Right 1 Implanted   Reamer Shaft, Mod, Trinkle     [id number] Right 1 Implanted   SCREW LOCKING BONE T2 5X40MM - [id number]  SCREW LOCKING BONE T2 5X40MM  PÉREZ [employer] EDYTA. [id number] Right 1 Implanted   Gamma4 Lag Screw     [id number] Right 1 Implanted       Operative Findings: See Op note    Estimated Blood Loss: * No values recorded between 9/29/2024  8:30 AM and 9/29/2024  9:49 AM *    Estimated Blood Loss has been documented.         Specimens:   Specimen (24h ago, onward)      None            [id number]

## 2024-09-29 NOTE — HPI
80 yo F with PMHx advanced dementia and HTN who presents to the ED from the [facility], [place], operated by [facility] after a fall today with noted R hip deformity. Pt. Was recently admitted to [facility] 9/18-9/27 for worsening confusion and debility which was ultimately determined to be related to progressive dementia. Pt.  reportedly also needed placement as they both have advanced dementia and unable to care for themselves. She was discharged yesterday and placed at the [facility] assisted living facility. Today, ellen reportedly had a fall in the dining room at the facility. She had noted deformity, EMS was contacted and reportedly wrapped pt. Pelvis in a sheet because of stability concerns. In ED, X-ray obtained revealed an impacted intertrochanteric fracture of the right femur.

## 2024-09-29 NOTE — ASSESSMENT & PLAN NOTE
-Pt. Not taking medications prior to recent admission, had previously been prescribed amlodipine, coreg, hctz in 2022. Restart previously prescribed med amlodipine at 5 mg. Monitor and titrate meds as needed and imrpoving now, with prn hydralazine as in ER was 180s-200 systolic

## 2024-09-29 NOTE — NURSING
Nurses Note -- 4 Eyes      9/28/2024   11:35 PM      Skin assessed during: Admit      [x] No Altered Skin Integrity Present    []Prevention Measures Documented      [] Yes- Altered Skin Integrity Present or Discovered   [] LDA Added if Not in Epic (Describe Wound)   [] New Altered Skin Integrity was Present on Admit and Documented in LDA   [] Wound Image Taken    Wound Care Consulted? No    Attending Nurse:  Addison BHAKTA    Second RN/Staff Member:   Nuha BHAKTA

## 2024-09-29 NOTE — ASSESSMENT & PLAN NOTE
Echocardiogram with evidence of tricuspid regurgitation that is mild . The patient's most recent echocardiogram result is listed below. We will manage the valvular abnormality by seen in 2022 at echo at [facility]    No echocardiogram results found for the past 12 months    Right side total ankle replacement surgery performed intra-operatively. See operative report for complete details.

## 2024-09-29 NOTE — ANESTHESIA RELEASE NOTE
"Anesthesia Release from PACU Note    Patient: Toña Carrero    Procedure(s) Performed: Procedure(s) (LRB):  INSERTION, INTRAMEDULLARY NORMA, FEMUR - right, karlie elliott table (Right)    Anesthesia type: general    Post pain: Adequate analgesia    Post assessment: no apparent anesthetic complications    Last Vitals: Visit Vitals  /61   Pulse 64   Temp 36.6 °C (97.9 °F) (Temporal)   Resp 16   Ht 5' 3" (1.6 m)   Wt 61.2 kg (134 lb 14.7 oz)   SpO2 (!) 92%   Breastfeeding No   BMI 23.90 kg/m²       Post vital signs: stable    Level of consciousness: awake    Nausea/Vomiting: no nausea/no vomiting    Complications: none    Airway Patency: patent    Respiratory: unassisted      Cardiovascular: stable    Hydration: euvolemic  "

## 2024-09-29 NOTE — ED NOTES
Tele box 1976 applied to pt. Chikis in war room states able to see pt on monitor, rhythm NSR with HR 73.

## 2024-09-29 NOTE — SUBJECTIVE & OBJECTIVE
History reviewed. No pertinent past medical history.    History reviewed. No pertinent surgical history.    Review of patient's allergies indicates:  No Known Allergies    No current facility-administered medications on file prior to encounter.     Current Outpatient Medications on File Prior to Encounter   Medication Sig    amLODIPine (NORVASC) 2.5 MG tablet Take 1 tablet by mouth once daily.    aspirin 81 MG Chew Take 81 mg by mouth once daily.    EScitalopram oxalate (LEXAPRO) 10 MG tablet Take 1 tablet by mouth once daily.    memantine (NAMENDA) 5 MG Tab Take 1 tablet by mouth once daily.     Family History    None       Tobacco Use    Smoking status: Never    Smokeless tobacco: Never   Substance and Sexual Activity    Alcohol use: Not on file    Drug use: Not on file    Sexual activity: Not on file     Review of Systems   Unable to perform ROS: Dementia     Objective:     Vital Signs (Most Recent):  Temp: 98.3 °F (36.8 °C) (09/28/24 1700)  Pulse: 80 (09/28/24 2130)  Resp: 20 (09/28/24 2130)  BP: (!) 166/64 (09/28/24 2130)  SpO2: 95 % (09/28/24 2130) Vital Signs (24h Range):  Temp:  [98.3 °F (36.8 °C)-98.5 °F (36.9 °C)] 98.3 °F (36.8 °C)  Pulse:  [64-80] 80  Resp:  [16-22] 20  SpO2:  [93 %-95 %] 95 %  BP: (166-225)/(64-91) 166/64     Weight: 61.2 kg (135 lb)  Body mass index is 23.91 kg/m².     Physical Exam  Vitals reviewed.   Constitutional:       General: She is not in acute distress.     Appearance: She is well-developed.   HENT:      Head: Normocephalic and atraumatic.   Eyes:      Extraocular Movements: Extraocular movements intact.      Pupils: Pupils are equal, round, and reactive to light.   Neck:      Vascular: No JVD.      Trachea: No tracheal deviation.   Cardiovascular:      Rate and Rhythm: Normal rate and regular rhythm.      Heart sounds: Murmur heard.      No friction rub. No gallop.   Pulmonary:      Effort: No respiratory distress.      Breath sounds: Normal breath sounds. No wheezing or  rales.   Abdominal:      General: Bowel sounds are normal. There is no distension.      Palpations: Abdomen is soft. There is no mass.      Tenderness: There is no abdominal tenderness.   Musculoskeletal:         General: Swelling, tenderness and deformity present.      Cervical back: Neck supple.   Lymphadenopathy:      Cervical: No cervical adenopathy.   Skin:     General: Skin is warm and dry.      Findings: No rash.   Neurological:      Mental Status: She is alert. Mental status is at baseline. She is disoriented.              CRANIAL NERVES     CN III, IV, VI   Pupils are equal, round, and reactive to light.       Significant Labs: All pertinent labs within the past 24 hours have been reviewed.    Significant Imaging: I have reviewed all pertinent imaging results/findings within the past 24 hours.

## 2024-09-29 NOTE — ASSESSMENT & PLAN NOTE
Hyponatremia is likely due to suspect intake but trend . The patient's most recent sodium results are listed below.  Recent Labs     09/28/24  1610 09/29/24  0458   * 129*     Plan  - Correct the sodium by 4-6mEq in 24 hours.   - Obtain the following studies: Urine sodium, urine osmolality, serum osmolality.  - Will treat the hyponatremia with IVF  - Monitor sodium Daily.   - Patient hyponatremia is  monitoring trend  -

## 2024-09-29 NOTE — PROGRESS NOTES
[facility] Medicine  Progress Note    Patient Name: Toña Carrero  MRN: [id number]  Patient Class: IP- Inpatient   Admission Date: 9/28/2024  Length of Stay: 1 days  Attending Physician: Amie Lindsay MD  Primary Care Provider: No primary care provider on file.        Subjective:     Principal Problem:Displaced intertrochanteric fracture of right femur, initial encounter for closed fracture        HPI:  80 yo F with PMHx advanced dementia and HTN who presents to the ED from the [facility] after a fall today with noted R hip deformity. Pt. Was recently admitted to [facility] 9/18-9/27 for worsening confusion and debility which was ultimately determined to be related to progressive dementia. Pt.  reportedly also needed placement as they both have advanced dementia and unable to care for themselves. She was discharged yesterday and placed at the [facility] assisted living facility. Today, paient reportedly had a fall in the dining room at the facility. She had noted deformity, EMS was contacted and reportedly wrapped pt. Pelvis in a sheet because of stability concerns. In ED, X-ray obtained revealed an impacted intertrochanteric fracture of the right femur.    Overview/Hospital Course:  No notes on file    Interval history- seen in pacu with nursing at bedside after IM nail today with ortho. She has severe dementia at baseline (notes from  the last 2 weeks report was disoriented, did not answer questions, was unaware of diarrhea she was having, etc) so baseline with alertness but not oriented and similar to notes on admit last night. Was on BP regimen in 2022 with norvasc, coreg, hctz but not on ercently. BP very high in ER on admit 180s-200s so norvasc started with improved BP, 140s in pacu now. Hydralazine PRn added and if spikes back up then would consider resuming other ones she's been on before. Was at [facility] a day after at  for placement due to dementia. Daughter is  caretaker per ortho. Will trial PT/OT tomorrow to see best options for post acute care with dementia.     No current facility-administered medications on file prior to encounter.     Current Outpatient Medications on File Prior to Encounter   Medication Sig    amLODIPine (NORVASC) 2.5 MG tablet Take 1 tablet by mouth once daily.    aspirin 81 MG Chew Take 81 mg by mouth once daily.    EScitalopram oxalate (LEXAPRO) 10 MG tablet Take 1 tablet by mouth once daily.    memantine (NAMENDA) 5 MG Tab Take 1 tablet by mouth once daily.     Family History    None       Tobacco Use    Smoking status: Never    Smokeless tobacco: Never   Substance and Sexual Activity    Alcohol use: Not on file    Drug use: Not on file    Sexual activity: Not on file     Review of Systems   Unable to perform ROS: Dementia     Objective:     Vital Signs (Most Recent):  Temp: 97.9 °F (36.6 °C) (09/29/24 0957)  Pulse: 63 (09/29/24 1100)  Resp: 13 (09/29/24 1100)  BP: (!) 119/58 (09/29/24 1100)  SpO2: 97 % (09/29/24 1100) Vital Signs (24h Range):  Temp:  [97.4 °F (36.3 °C)-98.5 °F (36.9 °C)] 97.9 °F (36.6 °C)  Pulse:  [63-80] 63  Resp:  [12-25] 13  SpO2:  [92 %-100 %] 97 %  BP: (110-225)/() 119/58     Weight: 61.2 kg (134 lb 14.7 oz)  Body mass index is 23.9 kg/m².     Physical Exam  Vitals reviewed.   Constitutional:       General: She is not in acute distress.     Appearance: She is well-developed.      Comments: Sleepy in pacu, opens eyes briefly but does not answer questions to myself or nursing in pacu   HENT:      Head: Normocephalic and atraumatic.   Eyes:      Extraocular Movements: Extraocular movements intact.      Pupils: Pupils are equal, round, and reactive to light.   Neck:      Vascular: No JVD.      Trachea: No tracheal deviation.   Cardiovascular:      Rate and Rhythm: Normal rate and regular rhythm.      Heart sounds: Murmur heard.      No friction rub. No gallop.   Pulmonary:      Effort: No respiratory distress.       Breath sounds: Normal breath sounds. No wheezing or rales.   Abdominal:      General: Bowel sounds are normal. There is no distension.      Palpations: Abdomen is soft. There is no mass.      Tenderness: There is no abdominal tenderness.   Musculoskeletal:      Cervical back: Neck supple.      Comments: Bandages to RLE.   Lymphadenopathy:      Cervical: No cervical adenopathy.   Skin:     General: Skin is warm and dry.      Findings: No rash.   Neurological:      Mental Status: She is alert. Mental status is at baseline. She is disoriented.              CRANIAL NERVES     CN III, IV, VI   Pupils are equal, round, and reactive to light.       Significant Labs: All pertinent labs within the past 24 hours have been reviewed.    Significant Imaging: I have reviewed all pertinent imaging results/findings within the past 24 hours.    Assessment/Plan:      * Displaced intertrochanteric fracture of right femur, initial encounter for closed fracture  -sustained in mechanical fall at the [facility] care unit after just admitting there a day prior after being at  for placement previously.  -s/p IM nail with ortho today with wbat with PT/OT tomorrow and will assess best options post op given dementia requriing the dementia unit vs need for thearpy and which needs may be most important post op  -eliquis for 35 days with end date nov 4th  -multimodal pain meds and bowel regimen  -vit D mildly low at 27 and replacement started  -felder out on POD 1      Vitamin D insufficiency  Mildly low at 28 and replacement started      Mild tricuspid regurgitation  Echocardiogram with evidence of tricuspid regurgitation that is mild . The patient's most recent echocardiogram result is listed below. We will manage the valvular abnormality by seen in 2022 at echo at osh    No echocardiogram results found for the past 12 months     Mild mitral regurgitation  Echocardiogram with evidence of mitral regurgitation that is mild . The patient's most  "recent echocardiogram result is listed below. We will manage the valvular abnormality by seen in 2022 on echo at OSH    No echocardiogram results found for the past 12 months     Chronic diastolic heart failure  Echo in 2022 with EF 55% and diastolic dysfunction. No acute issues      History of carotid stenosis  Per cards notes 33% right and 50% left      Hyponatremia  Hyponatremia is likely due to suspect intake but trend . The patient's most recent sodium results are listed below.  Recent Labs     09/28/24  1610 09/29/24  0458   * 129*     Plan  - Correct the sodium by 4-6mEq in 24 hours.   - Obtain the following studies: Urine sodium, urine osmolality, serum osmolality.  - Will treat the hyponatremia with IVF  - Monitor sodium Daily.   - Patient hyponatremia is  monitoring trend  -    HTN (hypertension)  -Pt. Not taking medications prior to recent admission, had previously been prescribed amlodipine, coreg, hctz in 2022. Restart previously prescribed med amlodipine at 5 mg. Monitor and titrate meds as needed and imrpoving now, with prn hydralazine as in ER was 180s-200 systolic    Advanced dementia  -Pt. Alert to person only, recently placed in NH one day prior to fall. CT Head at [facility] 9/18 "Diffuse cerebral volume loss with medial temporal lobe predilection in a pattern concerning for underlying Alzheimer's dementia"  -Delirium precautions, continue home namenda      VTE Risk Mitigation (From admission, onward)           Ordered     apixaban tablet 2.5 mg  2 times daily         09/29/24 0926     IP VTE HIGH RISK PATIENT  Once         09/29/24 0602     Place sequential compression device  Until discontinued         09/29/24 0602     Place sequential compression device  Until discontinued         09/28/24 2132                    Discharge Planning   CHRIS: 10/2/2024     Code Status: Full Code   Is the patient medically ready for discharge?:     Reason for patient still in hospital (select all that apply): " Patient trending condition                     Amie Lindsay MD  Department of Hospital Medicine   [facility] - Surgery

## 2024-09-29 NOTE — SUBJECTIVE & OBJECTIVE
"Principal Problem:Displaced intertrochanteric fracture of right femur, initial encounter for closed fracture    Principal Orthopedic Problem: same as above    Interval History: NAEO. VSS. Plan for OR today. Hgb 11.7.    Review of patient's allergies indicates:  No Known Allergies    Current Facility-Administered Medications   Medication    0.9%  NaCl infusion (for blood administration)    0.9%  NaCl infusion    acetaminophen tablet 1,000 mg    amLODIPine tablet 5 mg    bisacodyL suppository 10 mg    ceFAZolin 2 g in D5W 50 mL IVPB (MB+)    EScitalopram oxalate tablet 10 mg    hydrALAZINE tablet 50 mg    LIDOcaine (PF) 10 mg/ml (1%) injection 10 mg    melatonin tablet 6 mg    memantine tablet 5 mg    morphine injection 2 mg    mupirocin 2 % ointment 1 g    mupirocin 2 % ointment    ondansetron injection 4 mg    oxyCODONE immediate release tablet 10 mg    oxyCODONE immediate release tablet 5 mg    pregabalin capsule 75 mg    ropivacaine 0.2% Perineural Pump infusion 500 ML    sodium chloride 0.9% flush 10 mL    sodium chloride 0.9% flush 10 mL    tranexamic acid (CYKLOKAPRON) 1,000 mg in 0.9% NaCl 100 mL IVPB (MB+)    tranexamic acid (CYKLOKAPRON) 3,000 mg in 0.9% NaCl SolP 100 mL    vitamin D 1000 units tablet 1,000 Units     Objective:     Vital Signs (Most Recent):  Temp: 97.4 °F (36.3 °C) (09/29/24 0449)  Pulse: 63 (09/29/24 0449)  Resp: 16 (09/29/24 0632)  BP: (!) 142/69 (09/29/24 0449)  SpO2: 95 % (09/29/24 0449) Vital Signs (24h Range):  Temp:  [97.4 °F (36.3 °C)-98.5 °F (36.9 °C)] 97.4 °F (36.3 °C)  Pulse:  [63-80] 63  Resp:  [16-22] 16  SpO2:  [93 %-95 %] 95 %  BP: (114-225)/() 142/69     Weight: 61.2 kg (134 lb 14.7 oz)  Height: 5' 3" (160 cm)  Body mass index is 23.9 kg/m².      Intake/Output Summary (Last 24 hours) at 9/29/2024 0867  Last data filed at 9/28/2024 2300  Gross per 24 hour   Intake --   Output 350 ml   Net -350 ml        Ortho/SPM Exam    RLE:  Inspection: Shortened with mod swelling " over lateral hip  Skin intact throughout, no open wounds  No ecchymosis    Palpation: No bony TTP throughout  Pain with log roll   Compartments soft and compressible.   ROM: Painless ROM ankle    Neuro: TA/Gastroc/EHL/FHL assessed in isolation without deficit.   SILT Sa/Florence/DP/SP/T nerve distributions   Vascular: DP artery palpated 2+. Capillary refill <3s.           Significant Labs: All pertinent labs within the past 24 hours have been reviewed.    Significant Imaging: I have reviewed and interpreted all pertinent imaging results/findings.

## 2024-09-29 NOTE — ASSESSMENT & PLAN NOTE
Echocardiogram with evidence of mitral regurgitation that is mild . The patient's most recent echocardiogram result is listed below. We will manage the valvular abnormality by seen in 2022 on echo at OSH    No echocardiogram results found for the past 12 months

## 2024-09-29 NOTE — CONSULTS
Consult Note  Orthopedic Surgery    CC: right hip pain    SUBJECTIVE:     History of Present Illness:  Toña Carrero is a 79 y.o. female with PMH of dementia, CAD, aortic valve stenosis, HTN, and HLD presenting with right hip pain.  Patient reportedly fell at her assisted living center while reaching to grab some flowers.  Experienced  immediate pain and inability to bear weight.  Denies head injury or LOC.  Also denies any numbness/paresthesias or any other musculoskeletal pains.  Lives at [facility] and ambulates with a walker at baseline.  Does not smoke and is not on aspirin 81 mg daily.         Review of patient's allergies indicates:  No Known Allergies    History reviewed. No pertinent past medical history.  History reviewed. No pertinent surgical history.  No family history on file.  Social History     Tobacco Use    Smoking status: Never    Smokeless tobacco: Never        Review of Systems:  Constitutional: Negative for fevers and chills  HENT: Negative for congestion and headaches   Eyes: Negative for blurred vision   Cardiovascular: Negative for chest pain and palpitations  Respiratory: Negative for cough and shortness of breath   Hematologic/Lymphatic: Negative for bleeding problem. Does not bruise/bleed easily  Skin: Negative for dry skin and rash  Musculoskeletal: Positive for wrist pain; negative for back pain  Gastrointestinal: Negative for abdominal pain and n/v/d  Neurological: Negative for numbness and paresthesias     OBJECTIVE:     Vital Signs:  Temp:  [98.3 °F (36.8 °C)-98.5 °F (36.9 °C)] 98.3 °F (36.8 °C)  Pulse:  [64-79] 73  Resp:  [16-22] 18  SpO2:  [93 %-95 %] 95 %  BP: (188-225)/(85-91) 192/86    Physical Exam:  General:  no apparent distress, WDWN  HENT:  NCAT, Bilateral ears/eyes normal  CV:  Normal pulses, color, and cap refill  Lungs:  Normal respiratory effort  Neuro: No FND, awake, alert  Psych:  Oriented to Person, Place, Time, and Situation    MSK:        BUE:  Inspection: Skin intact throughout, no swelling, no effusions, no ecchymosis   Palpation: Non-TTP throughout, no palpable abnormality.   ROM: AROM and PROM of the shoulder, elbow, wrist, and hand intact without pain.   Neuro: AIN/PIN/Radial/Median/Ulnar Nerves assessed in isolation without deficit.   SILT throughout.    Vascular: Radial artery palpated 2+. Capillary refill <3s.         LLE:  Inspection: Skin intact throughout, no swelling, no effusions, no ecchymosis   Palpation: Non-TTP throughout. No palpable abnormality.   ROM: AROM and PROM of the hip, knee, ankle, and foot intact without pain.   Neuro: TA/EHL/Gastroc/FHL assessed in isolation without deficit.   SILT throughout.    Vascular: Foot is WWP. Capillary refill <3s.         RLE:  Inspection: Shortened with mod swelling over lateral hip  Skin intact throughout, no open wounds  No ecchymosis    Palpation: No bony TTP throughout  Pain with log roll   Compartments soft and compressible.   ROM: Painless ROM ankle    Neuro: TA/Gastroc/EHL/FHL assessed in isolation without deficit.   SILT Sa/Florence/DP/SP/T nerve distributions   Vascular: DP artery palpated 2+. Capillary refill <3s.        Spine/pelvis/axial body:  No tenderness to palpation of cervical, thoracic, or lumbar spine  Stable and without pain with direct anterior pressure over ASIS.  No decubitus ulcers        Diagnostic Results:  X-rays of the right hip showing intertrochanteric femur fx  CT right hip showing right intertrochanteric femur fx    ASSESSMENT/PLAN:     Toña Carrero is a 79 y.o. female with PMH of dementia, CAD, aortic valve stenosis, HTN, and HLD presenting with right intertrochanteric fracture.  Closed, neurovascularly intact, and without any other musculoskeletal injuries on physical exam.     -Admit to medicine hip fracture service for pre-operative optimization  -Pain: multimodal regimen limiting narcotics   -DVT Ppx: hold chemical, FCD's at all times  -2u prbc prepped and  held  -Abx: preop abx ordered  -Bed rest, Stevenson  -NPO midnight      Informed consent was obtained, patient marked, and case booked   Pre-operative labs:  Lab Results   Component Value Date    HGB 12.4 09/28/2024     09/28/2024    CREATININE 0.8 09/28/2024     09/28/2024      Lab Results   Component Value Date    INR 1.0 09/28/2024        Extensive discussion was held regarding the severity of the patient's injury and the high mortality rate associated with these fractures (30% at 1 year).  The risks/benefits/alternatives to operative management were explained, with risks including but not limited to: infection, bleeding, pain, stiffness, nerve/vascular injury, heterotopic ossification, leg length discrepancies, rotational deformities, non-union, mal-union, hardware failure, DVT/PE, even death. They express full understanding and wish to proceed with surgery.  No guarantees were implied or stated, and all questions were answered.              Signed:  Kylie Mcqueen M.D.   Orthopaedic Surgery Resident  09/28/2024

## 2024-09-29 NOTE — OP NOTE
OPERATIVE NOTE     DATE OF PROCEDURE:  9/29/2024     PREOPERATIVE DIAGNOSIS:            right intertrochanteric hip fracture, closed, displaced, initial encounter  Fall from standing     POSTOPERATIVE DIAGNOSIS:          right intertrochanteric hip fracture, closed, displaced, initial encounter  Fall from standing     PROCEDURE:              Open reduction internal fixation  right intertrochanteric hip fracture with intramedullary nail     SURGEON:       Donal Watson MD     ASSISTANT:                 RICH Thomas MD - Resident - Assisting  Ashwini Ovalle MD - Resident - Chief  Aj Arvizu MD - Resident - Assisting  Kylie Mcqueen MD - Resident - Assisting     ANESTHESIA:              General     EBL:                  200 mL     COMPLICATIONS:  none     IMPLANTS:       [place] Gamma 4, 11 x 340 mm  Compression screw, 90 mm  Distal interlocking screw, 5 mm x 40 mm, x1    SPECIMENS:    None     INDICATIONS FOR PROCEDURE:  79F  Mechanical fall 9/28  Immediate Right hip pain.  Inability to bear weight.  Brought to the emergency room.  Evaluated by orthopedic resident on-call team, hospitalist team.  Physical exam x-rays indicated an intertrochanteric hip fracture.  Admitted to the hospital for further care     At the time of my evaluation, patient complained of isolated pain in right hip, 7/10, sharp, stabbing, worse with motion, improved with rest.  No numbness and tingling.     Lives in assisted living facility.     Discussed diagnosis of intertrochanteric hip fracture with both patient and family members.  Discussed both non operative and operative management options.  Non operative management would consist of bed rest, protected weight bearing and would likely result in a malunion/nonunion, prolonged pain, debility, and the medical comorbidities associated with prolonged bed rest/immobility.  Discussed operative intervention the form of open reduction internal fixation with intramedullary nail.   Hopefully this would improve pain, alignment, mobility, healing potential, overall outcome.     The risks, benefits, and alternatives to surgery were discussed with the patient and/or family.    Specific risks discussed included, but were not limited to:  Limb length discrepancy, malrotation, head perforation, avascular necrosis, hip arthritis, abductor pain, limp, gait difficulty, failure of hardware, damage to nearby structures, including neurovascular structures leading to loss of function or loss of limb, bleeding, need for blood transfusion, pain, stiffness, scarring, numbness, tingling, weakness, compartment syndrome, malunion/nonunion, hardware failure, hardware prominence, infection, need for multiple staged procedures, prolonged antibiotics, iatrogenic fracture, heterotopic ossification, arthritis, a variety of medical complications including but not limited to heart attack, stroke, deep venous thrombosis, pulmonary embolism, prolonged hospitalization, prolonged intubation, and death.   Patient and/or family expressed an understanding and desires to proceed with surgery.   All questions were answered.  No guarantees were implied or stated.  Informed consent was obtained.        OPERATIVE PROCEDURE:  Patient met in the preop hold area, the correct site and side of surgery being the right hip were marked and verified.  Regional block placed by Anesthesia.  Patient brought back to the operative suite.       Fracture boots were placed.  Patient was transferred over to operative table and placed in supine position.  Peroneal post was placed. Operative side arm was draped across the chest and well padded. All bony prominences were appropriately padded.  We performed traction and reduction maneuvers, and were able to obtain a closed reduction of the fracture.  Right lower extremity was prepped and draped in normal sterile fashion. Preoperative antibiotics of Ancef 2g were given. 1g IV TXA was given to aid in  hemostasis     Time-out was performed verifying the correct patient, site/side of surgery, surgical consent, radiographs as applicable, preop antibiotics, necessary equipment, anticipated blood loss, length of procedure, postoperative disposition.     We began the case by marking anatomic landmarks on the patient. Incision was made proximal to the greater trochanter.  Fascia was incised. Awl was utilized. Guide pin was entered in a center center position confirmed on both AP and lateral fluoroscopic imaging.  It was advanced to the level of the lesser trochanter. Opening Reamer with appropriate soft tissue guide was utilized to open the canal.  Ball-tipped guidewire was inserted in an intramedullary location confirmed on fluoroscopic imaging.  This was advanced to the superior pole of patella. Nail was measured. We reamed once with a 12.5mm reamer to confirm that a 11 mm nail would fit.  Appropriate size/length nail was placed on the insertion jig and inserted into the intramedullary canal, confirmed on multiplanar fluoroscopic imaging.  This was inserted to the appropriate depth and ensured to not perforate anterior femoral cortex on lateral imaging.       We then used the outrigger guide and triple sleeve inserted through a lateral incision on the thigh to place a guide pin in appropriate center center position through the femoral neck and head.  We used fluoroscopic imaging to confirm our  pin placement.  This was appropriate tip apex distance.  We confirmed that the tip of the wire did not perforate femoral head.  We then used the lateral opening Reamer.  The appropriate size compression screw was then placed with the appropriate tip apex distance on AP and lateral views. Traction removed.  Fracture was compressed through the guide.  Set screw tightened.  Set screw loosened quarter turn to allow compression.    We turned our attention to placement of distal interlocking screws.  We chose to place 1 distal  interlocking screw to provide stability in axial loading and rotation.  1 screw was placed from lateral to medial using percutaneous stab incision and perfect [place] technique. We confirmed appropriate placement of the screws on both AP and lateral fluoroscopic imaging.      Final fluoroscopic imaging of the entire femur was taken in AP and lateral views confirming appropriate hardware placement fracture reduction.     Wounds were thoroughly irrigated with saline. Fascia was closed with 0 Vicryl.  Subcutaneous tissue closed with 3 O Vicryl. Skin closed with 3 Monocryl. Dermabond applied.  Aquacel dressing applied.     Prior to final closure all counts were confirmed to be correct.  Patient tolerated procedure well, was extubated, transferred to PACU for further recovery.     At the conclusion of the procedure the patient had soft and compressible compartments, brisk cap refill, palpable DP and PT pulse in the operative extremity.     POSTOPERATIVE PLAN:  79F, fall on 9/28/24  Right intertrochanteric hip fracture     9/29/24 - IM nail right hip IT fracture     Antibiotics x 24 hr  eliquis x 4 weeks postop for DVT prophylaxis     Hospitalist comanagement  Multimodal pain management  Calcium, vitamin-D, boost  PT     Fragility fracture Clinic referral     WBAT Right lower extremity  Right lower extremity, range of motion as tolerated     X-ray AP pelvis and right femur AP lateral views at subsequent followups     Follow-up postop 2 weeks, 6 weeks, 3 months, 6 months, 1 year           =====================  Joshua Thomas MD  Orthopaedic Surgery

## 2024-09-29 NOTE — NURSING TRANSFER
Nursing Transfer Note      9/29/2024   11:05 AM    Nurse giving handoff:ute francis   Nurse receiving handoff:ute alvarez     Reason patient is being transferred: post op     Transfer To: return to room     Transfer via bed    Transfer with cardiac monitoring    Transported by pct    Telemetry: Box Number [id number], Rate 60, and Rhythm nsr  Order for Tele Monitor? Yes    Additional Lines: Stevenson Catheter    Medicines sent: ropivacaine; iv fluids     Any special needs or follow-up needed: routine; bromage scale to be assessed and charted per antonio. RN communicated to her it was not done in pacu and will be done in room.     Chart send with patient: Yes    Notified: daughter    Patient reassessed at: 1100

## 2024-09-30 PROBLEM — H57.9 ITCHY EYES: Status: ACTIVE | Noted: 2024-09-30

## 2024-09-30 PROBLEM — D62 ACUTE BLOOD LOSS ANEMIA: Status: ACTIVE | Noted: 2024-09-30

## 2024-09-30 LAB
ALBUMIN SERPL BCP-MCNC: 3 G/DL (ref 3.5–5.2)
ALP SERPL-CCNC: 110 U/L (ref 55–135)
ALT SERPL W/O P-5'-P-CCNC: 15 U/L (ref 10–44)
ANION GAP SERPL CALC-SCNC: 7 MMOL/L (ref 8–16)
AST SERPL-CCNC: 37 U/L (ref 10–40)
BASOPHILS # BLD AUTO: 0.04 K/UL (ref 0–0.2)
BASOPHILS NFR BLD: 0.4 % (ref 0–1.9)
BILIRUB SERPL-MCNC: 0.4 MG/DL (ref 0.1–1)
BUN SERPL-MCNC: 19 MG/DL (ref 8–23)
CALCIUM SERPL-MCNC: 8.6 MG/DL (ref 8.7–10.5)
CHLORIDE SERPL-SCNC: 106 MMOL/L (ref 95–110)
CO2 SERPL-SCNC: 21 MMOL/L (ref 23–29)
CREAT SERPL-MCNC: 0.9 MG/DL (ref 0.5–1.4)
DIFFERENTIAL METHOD BLD: ABNORMAL
EOSINOPHIL # BLD AUTO: 0.1 K/UL (ref 0–0.5)
EOSINOPHIL NFR BLD: 0.6 % (ref 0–8)
ERYTHROCYTE [DISTWIDTH] IN BLOOD BY AUTOMATED COUNT: 14.9 % (ref 11.5–14.5)
EST. GFR  (NO RACE VARIABLE): >60 ML/MIN/1.73 M^2
GLUCOSE SERPL-MCNC: 96 MG/DL (ref 70–110)
HCT VFR BLD AUTO: 30.6 % (ref 37–48.5)
HGB BLD-MCNC: 9.8 G/DL (ref 12–16)
IMM GRANULOCYTES # BLD AUTO: 0.07 K/UL (ref 0–0.04)
IMM GRANULOCYTES NFR BLD AUTO: 0.6 % (ref 0–0.5)
LYMPHOCYTES # BLD AUTO: 1.8 K/UL (ref 1–4.8)
LYMPHOCYTES NFR BLD: 16.4 % (ref 18–48)
MAGNESIUM SERPL-MCNC: 1.7 MG/DL (ref 1.6–2.6)
MCH RBC QN AUTO: 27.8 PG (ref 27–31)
MCHC RBC AUTO-ENTMCNC: 32 G/DL (ref 32–36)
MCV RBC AUTO: 87 FL (ref 82–98)
MONOCYTES # BLD AUTO: 1.4 K/UL (ref 0.3–1)
MONOCYTES NFR BLD: 12.6 % (ref 4–15)
NEUTROPHILS # BLD AUTO: 7.5 K/UL (ref 1.8–7.7)
NEUTROPHILS NFR BLD: 69.4 % (ref 38–73)
NRBC BLD-RTO: 0 /100 WBC
OSMOLALITY UR: 623 MOSM/KG (ref 50–1200)
PHOSPHATE SERPL-MCNC: 3.3 MG/DL (ref 2.7–4.5)
PLATELET # BLD AUTO: 293 K/UL (ref 150–450)
PMV BLD AUTO: 9.4 FL (ref 9.2–12.9)
POTASSIUM SERPL-SCNC: 4 MMOL/L (ref 3.5–5.1)
PROT SERPL-MCNC: 6.1 G/DL (ref 6–8.4)
RBC # BLD AUTO: 3.52 M/UL (ref 4–5.4)
SODIUM SERPL-SCNC: 134 MMOL/L (ref 136–145)
SODIUM UR-SCNC: <10 MMOL/L (ref 20–250)
WBC # BLD AUTO: 10.8 K/UL (ref 3.9–12.7)

## 2024-09-30 PROCEDURE — 94761 N-INVAS EAR/PLS OXIMETRY MLT: CPT

## 2024-09-30 PROCEDURE — 80053 COMPREHEN METABOLIC PANEL: CPT | Performed by: HOSPITALIST

## 2024-09-30 PROCEDURE — 81001 URINALYSIS AUTO W/SCOPE: CPT | Performed by: HOSPITALIST

## 2024-09-30 PROCEDURE — 25000003 PHARM REV CODE 250: Performed by: HOSPITALIST

## 2024-09-30 PROCEDURE — 83935 ASSAY OF URINE OSMOLALITY: CPT | Performed by: HOSPITALIST

## 2024-09-30 PROCEDURE — 21400001 HC TELEMETRY ROOM

## 2024-09-30 PROCEDURE — 97162 PT EVAL MOD COMPLEX 30 MIN: CPT

## 2024-09-30 PROCEDURE — 87086 URINE CULTURE/COLONY COUNT: CPT | Performed by: HOSPITALIST

## 2024-09-30 PROCEDURE — 25000003 PHARM REV CODE 250

## 2024-09-30 PROCEDURE — 84100 ASSAY OF PHOSPHORUS: CPT | Performed by: HOSPITALIST

## 2024-09-30 PROCEDURE — 36415 COLL VENOUS BLD VENIPUNCTURE: CPT | Performed by: HOSPITALIST

## 2024-09-30 PROCEDURE — 84300 ASSAY OF URINE SODIUM: CPT | Performed by: HOSPITALIST

## 2024-09-30 PROCEDURE — 25000003 PHARM REV CODE 250: Performed by: ANESTHESIOLOGY

## 2024-09-30 PROCEDURE — 97535 SELF CARE MNGMENT TRAINING: CPT

## 2024-09-30 PROCEDURE — 97530 THERAPEUTIC ACTIVITIES: CPT

## 2024-09-30 PROCEDURE — 63600175 PHARM REV CODE 636 W HCPCS

## 2024-09-30 PROCEDURE — 83735 ASSAY OF MAGNESIUM: CPT | Performed by: HOSPITALIST

## 2024-09-30 PROCEDURE — 99231 SBSQ HOSP IP/OBS SF/LOW 25: CPT | Mod: GC,,, | Performed by: ANESTHESIOLOGY

## 2024-09-30 PROCEDURE — 85025 COMPLETE CBC W/AUTO DIFF WBC: CPT | Performed by: HOSPITALIST

## 2024-09-30 PROCEDURE — 97165 OT EVAL LOW COMPLEX 30 MIN: CPT

## 2024-09-30 RX ORDER — METHOCARBAMOL 500 MG/1
500 TABLET, FILM COATED ORAL EVERY 8 HOURS PRN
Status: DISCONTINUED | OUTPATIENT
Start: 2024-09-30 | End: 2024-10-02 | Stop reason: HOSPADM

## 2024-09-30 RX ORDER — METHOCARBAMOL 750 MG/1
750 TABLET, FILM COATED ORAL EVERY 8 HOURS
Status: DISCONTINUED | OUTPATIENT
Start: 2024-09-30 | End: 2024-09-30

## 2024-09-30 RX ORDER — NALOXONE HCL 0.4 MG/ML
0.4 VIAL (ML) INJECTION
Status: DISCONTINUED | OUTPATIENT
Start: 2024-09-30 | End: 2024-10-02 | Stop reason: HOSPADM

## 2024-09-30 RX ORDER — NALOXONE HCL 0.4 MG/ML
VIAL (ML) INJECTION
Status: COMPLETED
Start: 2024-09-30 | End: 2024-09-30

## 2024-09-30 RX ADMIN — OXYCODONE HYDROCHLORIDE 5 MG: 5 TABLET ORAL at 08:09

## 2024-09-30 RX ADMIN — CIPROFLOXACIN 2 DROP: 3 SOLUTION OPHTHALMIC at 02:09

## 2024-09-30 RX ADMIN — AMLODIPINE BESYLATE 5 MG: 5 TABLET ORAL at 08:09

## 2024-09-30 RX ADMIN — CIPROFLOXACIN 2 DROP: 3 SOLUTION OPHTHALMIC at 10:09

## 2024-09-30 RX ADMIN — NALXONE HYDROCHLORIDE 0.4 MG: 0.4 INJECTION INTRAMUSCULAR; INTRAVENOUS; SUBCUTANEOUS at 04:09

## 2024-09-30 RX ADMIN — Medication 6 MG: at 08:09

## 2024-09-30 RX ADMIN — CIPROFLOXACIN 2 DROP: 3 SOLUTION OPHTHALMIC at 06:09

## 2024-09-30 RX ADMIN — ESCITALOPRAM OXALATE 10 MG: 10 TABLET ORAL at 08:09

## 2024-09-30 RX ADMIN — SODIUM CHLORIDE: 9 INJECTION, SOLUTION INTRAVENOUS at 08:09

## 2024-09-30 RX ADMIN — ACETAMINOPHEN 1000 MG: 500 TABLET ORAL at 12:09

## 2024-09-30 RX ADMIN — METHOCARBAMOL 750 MG: 750 TABLET ORAL at 02:09

## 2024-09-30 RX ADMIN — ACETAMINOPHEN 1000 MG: 500 TABLET ORAL at 06:09

## 2024-09-30 RX ADMIN — CIPROFLOXACIN 2 DROP: 3 SOLUTION OPHTHALMIC at 08:09

## 2024-09-30 RX ADMIN — MEMANTINE HYDROCHLORIDE 5 MG: 5 TABLET ORAL at 08:09

## 2024-09-30 RX ADMIN — APIXABAN 2.5 MG: 2.5 TABLET, FILM COATED ORAL at 08:09

## 2024-09-30 RX ADMIN — CHOLECALCIFEROL TAB 25 MCG (1000 UNIT) 1000 UNITS: 25 TAB at 08:09

## 2024-09-30 RX ADMIN — MUPIROCIN 1 G: 20 OINTMENT TOPICAL at 08:09

## 2024-09-30 RX ADMIN — ACETAMINOPHEN 1000 MG: 500 TABLET ORAL at 05:09

## 2024-09-30 RX ADMIN — CIPROFLOXACIN 2 DROP: 3 SOLUTION OPHTHALMIC at 12:09

## 2024-09-30 RX ADMIN — CIPROFLOXACIN 2 DROP: 3 SOLUTION OPHTHALMIC at 05:09

## 2024-09-30 RX ADMIN — HYPROMELLOSE 2910 1 DROP: 5 SOLUTION/ DROPS OPHTHALMIC at 08:09

## 2024-09-30 NOTE — PROGRESS NOTES
[facility] - [facility]  Orthopedics  Progress Note    Patient Name: Toña Carrero  MRN: [id number]  Admission Date: 9/28/2024  Hospital Length of Stay: 2 days  Attending Provider: Amie Lindsay MD  Primary Care Provider: Inez, Primary Doctor  Follow-up For: Procedure(s) (LRB):  INSERTION, INTRAMEDULLARY NORMA, FEMUR - right, earl, hana table (Right)    Post-Operative Day: 1 Day Post-Op  Subjective:     Principal Problem:Displaced intertrochanteric fracture of right femur, initial encounter for closed fracture    Principal Orthopedic Problem: as above; s/p IMN 9/29    Interval History: POD-1. Pt seen and examined at bedside. VSS, afebrile.  NAEON. Reported improved pain of right hip. Reports no new symptoms. Denies fevers, chills, N/V. Will work with PT today.      Review of patient's allergies indicates:  No Known Allergies    Current Facility-Administered Medications   Medication    0.9%  NaCl infusion (for blood administration)    0.9%  NaCl infusion    acetaminophen tablet 1,000 mg    amLODIPine tablet 5 mg    apixaban tablet 2.5 mg    artificial tears 0.5 % ophthalmic solution 1 drop    bisacodyL suppository 10 mg    ciprofloxacin HCl 0.3 % ophthalmic solution 2 drop    dextrose 10% bolus 125 mL 125 mL    dextrose 10% bolus 250 mL 250 mL    EScitalopram oxalate tablet 10 mg    fentaNYL 50 mcg/mL injection 25 mcg    glucagon (human recombinant) injection 1 mg    hydrALAZINE tablet 50 mg    LIDOcaine (PF) 10 mg/ml (1%) injection 10 mg    melatonin tablet 6 mg    memantine tablet 5 mg    morphine injection 2 mg    mupirocin 2 % ointment 1 g    mupirocin 2 % ointment 1 g    mupirocin 2 % ointment    ondansetron injection 4 mg    oxyCODONE immediate release tablet 10 mg    oxyCODONE immediate release tablet 5 mg    pregabalin capsule 75 mg    ropivacaine 0.2% Perineural Pump infusion 500 ML    sodium chloride 0.9% flush 10 mL    sodium chloride 0.9% flush 10 mL    tranexamic acid (CYKLOKAPRON) 1,000 mg in 0.9% NaCl  "100 mL IVPB (MB+)    tranexamic acid (CYKLOKAPRON) 3,000 mg in 0.9% NaCl SolP 100 mL    vitamin D 1000 units tablet 1,000 Units     Objective:     Vital Signs (Most Recent):  Temp: 97.6 °F (36.4 °C) (09/30/24 0329)  Pulse: 79 (09/30/24 0329)  Resp: 18 (09/30/24 0329)  BP: 134/63 (09/30/24 0329)  SpO2: (!) 92 % (09/29/24 2323) Vital Signs (24h Range):  Temp:  [96.4 °F (35.8 °C)-97.9 °F (36.6 °C)] 97.6 °F (36.4 °C)  Pulse:  [59-79] 79  Resp:  [12-25] 18  SpO2:  [92 %-100 %] 92 %  BP: (110-141)/(53-67) 134/63     Weight: 61.2 kg (134 lb 14.7 oz)  Height: 5' 3" (160 cm)  Body mass index is 23.9 kg/m².      Intake/Output Summary (Last 24 hours) at 9/30/2024 0601  Last data filed at 9/30/2024 0116  Gross per 24 hour   Intake 2674.13 ml   Output 350 ml   Net 2324.13 ml        Ortho/SPM Exam  NAD, resting comfortably in bed  AAOx3  No increased WOB  Extremities WWP    RLE    Dressing C/D/I   Appropriate post-op swelling, erythema.   Compartments soft/compressible  SILT throughout  Motor intact TA/EHL/Gastroc/FHL   DP pulse 2+. Brisk cap refill             Significant Labs: CBC:   Recent Labs   Lab 09/28/24  1610 09/29/24  0458 09/30/24  0524   WBC 11.14 10.42 10.80   HGB 12.4 11.7* 9.8*   HCT 38.0 36.8* 30.6*    322 293     CMP:   Recent Labs   Lab 09/28/24  1610 09/29/24  0458 09/30/24  0523   * 129* 134*   K 3.9 4.0 4.0    102 106   CO2 19* 19* 21*    108 96   BUN 16 18 19   CREATININE 0.8 0.8 0.9   CALCIUM 9.3 9.3 8.6*   PROT 6.8 6.7 6.1   ALBUMIN 3.4* 3.3* 3.0*   BILITOT 0.5 0.5 0.4   ALKPHOS 136* 141* 110   AST 28 29 37   ALT 13 12 15   ANIONGAP 10 8 7*     All pertinent labs within the past 24 hours have been reviewed.    Significant Imaging: I have reviewed and interpreted all pertinent imaging results/findings.  Assessment/Plan:     * Displaced intertrochanteric fracture of right femur, initial encounter for closed fracture  Toña Carrero is a 79 y.o. female with Right IT fracture, closed, " NVI. They take no anticoagulation at home. They required walker for ambulation prior to this injury.     -Admitted to medicine hip fracture service   -DVT PPx: Eliquis 2.5 BID  -Abx: post op ancef 24 hrs  -Labs: Hgb 9.8  -Pain: multimodal pain control  -Stevenson  -PT/OT - WBAT RLE  -Regular diet          Kylie Mcqueen MD  Orthopedics  [facility] - Surgery

## 2024-09-30 NOTE — ASSESSMENT & PLAN NOTE
Hyponatremia is likely due to suspect intake but trend . The patient's most recent sodium results are listed below.  Recent Labs     09/28/24  1610 09/29/24  0458 09/30/24  0523   * 129* 134*       Plan  - Correct the sodium by 4-6mEq in 24 hours.   - Obtain the following studies: Urine sodium, urine osmolality, serum osmolality.  - Will treat the hyponatremia with IVF  - Monitor sodium Daily.   - Patient hyponatremia is  monitoring trend  -improving 129 to 134

## 2024-09-30 NOTE — ASSESSMENT & PLAN NOTE
Anemia is likely due to acute blood loss which was from surgery . Most recent hemoglobin and hematocrit are listed below.  Recent Labs     09/28/24  1610 09/29/24  0458 09/30/24  0524   HGB 12.4 11.7* 9.8*   HCT 38.0 36.8* 30.6*     Plan  - Monitor serial CBC: Daily  - Transfuse PRBC if patient becomes hemodynamically unstable, symptomatic or H/H drops below 7/21.  - Patient has not received any PRBC transfusions to date  - Patient's anemia is currently stable  -

## 2024-09-30 NOTE — SUBJECTIVE & OBJECTIVE
Interval history- she was awake and alert and reports no pain to her hip now with PNC in place. Has baseline dementia but able to answer questions but not completely oriented to situation. She ate most of breakfat and reports good appetite. Complaints of dry eyes yesterday refractory to eye drops and nursing contacted anesthesia who appear gave fluoroscein and started cipro eye drops to right eye so suspect could have eye irritation from surgery as reason for started but no note specifically stating that from them and will continue, she was keeping her right eye closed at times on exam btu she denied eyes burnign today. Will see how PT/OT goes as was at the pranay before but may have higher level needs for thearpy post op than can provide so CM/SW to assist      No current facility-administered medications on file prior to encounter.     Current Outpatient Medications on File Prior to Encounter   Medication Sig    amLODIPine (NORVASC) 2.5 MG tablet Take 1 tablet by mouth once daily.    aspirin 81 MG Chew Take 81 mg by mouth once daily.    EScitalopram oxalate (LEXAPRO) 10 MG tablet Take 1 tablet by mouth once daily.    memantine (NAMENDA) 5 MG Tab Take 1 tablet by mouth once daily.     Family History    None       Tobacco Use    Smoking status: Never    Smokeless tobacco: Never   Substance and Sexual Activity    Alcohol use: Not on file    Drug use: Not on file    Sexual activity: Not on file     Review of Systems   Unable to perform ROS: Dementia     Objective:     Vital Signs (Most Recent):  Temp: 98.7 °F (37.1 °C) (09/30/24 0737)  Pulse: 75 (09/30/24 0737)  Resp: 17 (09/30/24 0821)  BP: 119/66 (09/30/24 0737)  SpO2: (!) 94 % (09/30/24 0737) Vital Signs (24h Range):  Temp:  [96.4 °F (35.8 °C)-98.7 °F (37.1 °C)] 98.7 °F (37.1 °C)  Pulse:  [59-79] 75  Resp:  [12-25] 17  SpO2:  [92 %-100 %] 94 %  BP: (110-141)/(53-67) 119/66     Weight: 61.2 kg (134 lb 14.7 oz)  Body mass index is 23.9 kg/m².     Physical Exam  Vitals  reviewed.   Constitutional:       General: She is not in acute distress.     Appearance: She is well-developed.      Comments: Alert and awake   HENT:      Head: Normocephalic and atraumatic.      Ears:      Comments: Kept right eye closed initially but then opened during exam. No obvious abrasions  Eyes:      Extraocular Movements: Extraocular movements intact.      Pupils: Pupils are equal, round, and reactive to light.   Neck:      Vascular: No JVD.      Trachea: No tracheal deviation.   Cardiovascular:      Rate and Rhythm: Normal rate and regular rhythm.      Heart sounds: Murmur heard.      No friction rub. No gallop.   Pulmonary:      Effort: No respiratory distress.      Breath sounds: Normal breath sounds. No wheezing or rales.   Abdominal:      General: Bowel sounds are normal. There is no distension.      Palpations: Abdomen is soft. There is no mass.      Tenderness: There is no abdominal tenderness.   Musculoskeletal:      Cervical back: Neck supple.      Comments: Bandages to RLE.   Lymphadenopathy:      Cervical: No cervical adenopathy.   Skin:     General: Skin is warm and dry.      Findings: No rash.   Neurological:      Mental Status: She is alert. Mental status is at baseline. She is disoriented.              CRANIAL NERVES     CN III, IV, VI   Pupils are equal, round, and reactive to light.       Significant Labs: All pertinent labs within the past 24 hours have been reviewed.    Significant Imaging: I have reviewed all pertinent imaging results/findings within the past 24 hours.

## 2024-09-30 NOTE — ASSESSMENT & PLAN NOTE
Toña Carrero is a 79 y.o. female with Right IT fracture, closed, NVI. They take no anticoagulation at home. They required walker for ambulation prior to this injury.     -Admitted to medicine hip fracture service   -DVT PPx: Eliquis 2.5 BID  -Abx: post op ancef 24 hrs  -Labs: Hgb 9.8  -Pain: multimodal pain control  -Stevenson  -PT/OT - WBAT RLE  -Regular diet

## 2024-09-30 NOTE — PT/OT/SLP EVAL
Physical Therapy Evaluation and Treatment    Patient Name: Toña Carrero   MRN: [id number]  Recent Surgery: Procedure(s) (LRB):  INSERTION, INTRAMEDULLARY NORMA, FEMUR - right, earl, hana table (Right) 1 Day Post-Op    Recommendations:     Discharge Recommendations: Moderate Intensity Therapy   Discharge Equipment Recommendations: walker, rolling, wheelchair, bedside commode   DME Justifications (see above for complete DME recommendations)    Bedside Commode- Patient has a mobility limitation that significantly impairs their ability to participate in one or more mobility related activities of daily living, including toileting. This deficit can be resolved by using a bedside commode. Patient demonstrates mobility limitations that will cause them to be confined to one room at home without bathroom access for up to 30 days. Using a bedside commode will greatly improve the patient's ability to participate in MRADLs.     Wheelchair-  Patient has a mobility limitation that significantly impairs their ability to participate in one or more mobility related activities of daily living in customary locations in the home. The mobility limitation cannot be sufficiently resolved by the use of a cane or walker. The use of a manual wheelchair will greatly improve the patient's ability to participate in MRADLs. The patient will use the wheelchair on a regular basis at home. They have expressed their willingness to use a manual wheelchair in the home, and have a caregiver who is available and willing to assist with the wheelchair if needed.     Barriers to discharge:  requires increased assistance for functional mobility     Assessment:     Toña Carrero is a 79 y.o. female admitted with a medical diagnosis of Displaced intertrochanteric fracture of right femur, initial encounter for closed fracture. She presents with the following impairments/functional limitations: weakness, impaired functional mobility, gait instability, impaired  balance, impaired endurance, impaired self care skills, decreased lower extremity function, decreased safety awareness, pain, decreased ROM, decreased coordination, impaired skin, orthopedic precautions. Patient tolerated PT session fairly today. She sat on edge of bed ~10 minutes with CGA. She performed standing trials with maximal assistance x2 people. She was unable to take steps today due to R LE pain.     Rehab Prognosis: Good; patient would benefit from acute PT services to address these deficits and reach maximum level of function.    Plan:     During this hospitalization, patient to be seen 4 x/week (hip pathway see 10/1 and 10/2 then decrease to 4x's/week) to address the above listed problems via gait training, therapeutic activities, therapeutic exercises, neuromuscular re-education    Plan of Care Expires: 10/30/24    Subjective     Chief Complaint: Right leg pain with weight bearing but unable to rate with number.   Patient Comments/Goals: Did not verbalize.   Pain/Comfort:  Pain Rating 1:  (did not rate)  Location - Side 1: Right  Location 1: hip  Pain Addressed 1: Pre-medicate for activity, Reposition, Distraction, Nurse notified    Social History: No family present and patient unable to answer most questions.   Living Environment: Patient lives at the [facility] per chart. She had a fall prior to coming into the hospital.    Prior Level of Function: ?  Equipment Used at Home:  (patient unable to verbalize)  Assistance Upon Discharge: ?    Objective:     Communicated with RN prior to session. Patient found supine with oxygen, perineural catheter, FCD, bed alarm, felder catheter, telemetry, peripheral IV upon PT entry to room.    General Precautions: Standard, fall   Orthopedic Precautions: R LE WBAT   Braces: N/A    Respiratory Status: Nasal cannula, flow .5 L/min    Exams:  RLE ROM:  limited due to recent surgery  RLE Strength:  limited due to recent surgery  LLE ROM: WFL  LLE Strength:  WFL  Cognitive: Patient is oriented to name only     Functional Mobility:  Gait belt applied - Yes  Bed Mobility  Supine to Sit: maximal assistance for LE management and trunk management  Sit to Supine: maximal assistance for LE management and trunk management  Transfers  Sit to Stand: maximal assistance of 2 persons with hand-held assist on 1st trial and rolling walker on 2nd trial  Gait  Unable to take steps due to R LE pain  Balance  Sitting: She sat on edge of bed ~10 minutes with CGA and posterior lean.     Therapeutic Activities and Exercises:  Patient educated in:  -PT role and POC  -safety with transfers including hand placement    AM-PAC 6 CLICK MOBILITY  Total Score:10    Patient left supine with all lines intact, call button in reach, RN notified, and bed alarm on.    GOALS:   Multidisciplinary Problems       Physical Therapy Goals          Problem: Physical Therapy    Goal Priority Disciplines Outcome Interventions   Physical Therapy Goal     PT, PT/OT Progressing    Description: Goals to be met by: 10/30/2024     Patient will increase functional independence with mobility by performin. Supine to sit with Minimal Assistance  2. Sit to stand transfer with Minimal Assistance  3. Bed to chair transfer with Minimal Assistance using Rolling Walker  4. Gait x 20 feet with Minimal Assistance using Rolling Walker                          History:   History reviewed. No pertinent past medical history.    Past Surgical History:   Procedure Laterality Date    INTRAMEDULLARY RODDING OF FEMUR Right 2024    Procedure: INSERTION, INTRAMEDULLARY NORMA, FEMUR - right, karlie elliott table;  Surgeon: Veronica Jimenez MD;  Location: [facility] OR [address] [place] [address];  Service: Orthopedics;  Laterality: Right;       Time Tracking:     PT Received On: 24  PT Start Time: 1115  PT Stop Time: 1140  PT Total Time (min): 25 min     Billable Minutes: Evaluation 10 and Therapeutic Activity 15    Co-evaluation performed for this  visit due to patient need for two skilled therapists to ensure patient and staff safety and to accommodate for patient activity tolerance/pain management.    9/30/2024

## 2024-09-30 NOTE — ASSESSMENT & PLAN NOTE
Refractory to eye drops, anesthesia came to see with fluorosceien ons unday and starred cipro eye drops to right eye so possible corneal irritation from surgery, not fully documented in notes but will continue. Denies itching/burning 9/30

## 2024-09-30 NOTE — PROGRESS NOTES
[facility] Medicine  Progress Note    Patient Name: Toña Carrero  MRN: [id number]  Patient Class: IP- Inpatient   Admission Date: 9/28/2024  Length of Stay: 2 days  Attending Physician: Amie Lindsay MD  Primary Care Provider: Inez, Primary Doctor        Subjective:     Principal Problem:Displaced intertrochanteric fracture of right femur, initial encounter for closed fracture        HPI:  80 yo F with PMHx advanced dementia and HTN who presents to the ED from the [facility] after a fall today with noted R hip deformity. Pt. Was recently admitted to [facility] 9/18-9/27 for worsening confusion and debility which was ultimately determined to be related to progressive dementia. Pt.  reportedly also needed placement as they both have advanced dementia and unable to care for themselves. She was discharged yesterday and placed at the [facility] assisted living facility. Today, paient reportedly had a fall in the dining room at the facility. She had noted deformity, EMS was contacted and reportedly wrapped pt. Pelvis in a sheet because of stability concerns. In ED, X-ray obtained revealed an impacted intertrochanteric fracture of the right femur.    Overview/Hospital Course:  No notes on file    Interval history- she was awake and alert and reports no pain to her hip now with PNC in place. Has baseline dementia but able to answer questions but not completely oriented to situation. She ate most of breakfat and reports good appetite. Complaints of dry eyes yesterday refractory to eye drops and nursing contacted anesthesia who appear gave fluoroscein and started cipro eye drops to right eye so suspect could have eye irritation from surgery as reason for started but no note specifically stating that from them and will continue, she was keeping her right eye closed at times on exam btu she denied eyes burnign today. Will see how PT/OT goes as was at the [facility] before but may have higher level  needs for thearpy post op than can provide so CM/SW to assist      No current facility-administered medications on file prior to encounter.     Current Outpatient Medications on File Prior to Encounter   Medication Sig    amLODIPine (NORVASC) 2.5 MG tablet Take 1 tablet by mouth once daily.    aspirin 81 MG Chew Take 81 mg by mouth once daily.    EScitalopram oxalate (LEXAPRO) 10 MG tablet Take 1 tablet by mouth once daily.    memantine (NAMENDA) 5 MG Tab Take 1 tablet by mouth once daily.     Family History    None       Tobacco Use    Smoking status: Never    Smokeless tobacco: Never   Substance and Sexual Activity    Alcohol use: Not on file    Drug use: Not on file    Sexual activity: Not on file     Review of Systems   Unable to perform ROS: Dementia     Objective:     Vital Signs (Most Recent):  Temp: 98.7 °F (37.1 °C) (09/30/24 0737)  Pulse: 75 (09/30/24 0737)  Resp: 17 (09/30/24 0821)  BP: 119/66 (09/30/24 0737)  SpO2: (!) 94 % (09/30/24 0737) Vital Signs (24h Range):  Temp:  [96.4 °F (35.8 °C)-98.7 °F (37.1 °C)] 98.7 °F (37.1 °C)  Pulse:  [59-79] 75  Resp:  [12-25] 17  SpO2:  [92 %-100 %] 94 %  BP: (110-141)/(53-67) 119/66     Weight: 61.2 kg (134 lb 14.7 oz)  Body mass index is 23.9 kg/m².     Physical Exam  Vitals reviewed.   Constitutional:       General: She is not in acute distress.     Appearance: She is well-developed.      Comments: Alert and awake   HENT:      Head: Normocephalic and atraumatic.      Ears:      Comments: Kept right eye closed initially but then opened during exam. No obvious abrasions  Eyes:      Extraocular Movements: Extraocular movements intact.      Pupils: Pupils are equal, round, and reactive to light.   Neck:      Vascular: No JVD.      Trachea: No tracheal deviation.   Cardiovascular:      Rate and Rhythm: Normal rate and regular rhythm.      Heart sounds: Murmur heard.      No friction rub. No gallop.   Pulmonary:      Effort: No respiratory distress.      Breath sounds:  Normal breath sounds. No wheezing or rales.   Abdominal:      General: Bowel sounds are normal. There is no distension.      Palpations: Abdomen is soft. There is no mass.      Tenderness: There is no abdominal tenderness.   Musculoskeletal:      Cervical back: Neck supple.      Comments: Bandages to RLE.   Lymphadenopathy:      Cervical: No cervical adenopathy.   Skin:     General: Skin is warm and dry.      Findings: No rash.   Neurological:      Mental Status: She is alert. Mental status is at baseline. She is disoriented.              CRANIAL NERVES     CN III, IV, VI   Pupils are equal, round, and reactive to light.       Significant Labs: All pertinent labs within the past 24 hours have been reviewed.    Significant Imaging: I have reviewed all pertinent imaging results/findings within the past 24 hours.    Assessment/Plan:      * Displaced intertrochanteric fracture of right femur, initial encounter for closed fracture  -sustained in mechanical fall at the [facility] care unit after just admitting there a day prior after being at  for placement previously.  -s/p IM nail with ortho today with wbat with PT/OT tomorrow and will assess best options post op given dementia requriing the dementia unit vs need for thearpy and which needs may be most important post op  -eliquis for 35 days with end date nov 4th  -multimodal pain meds and bowel regimen. PNC in place.   -vit D mildly low at 27 and replacement started  -felder out on POD 1- today      Itchy eyes  Refractory to eye drops, anesthesia came to see with fluorosceien ons unday and starred cipro eye drops to right eye so possible corneal irritation from surgery, not fully documented in notes but will continue. Denies itching/burning 9/30      Acute blood loss anemia  Anemia is likely due to acute blood loss which was from surgery . Most recent hemoglobin and hematocrit are listed below.  Recent Labs     09/28/24  1610 09/29/24  0458 09/30/24  0524   HGB 12.4  11.7* 9.8*   HCT 38.0 36.8* 30.6*     Plan  - Monitor serial CBC: Daily  - Transfuse PRBC if patient becomes hemodynamically unstable, symptomatic or H/H drops below 7/21.  - Patient has not received any PRBC transfusions to date  - Patient's anemia is currently stable  -    Vitamin D insufficiency  Mildly low at 27 and replacement started      Mild tricuspid regurgitation  Echocardiogram with evidence of tricuspid regurgitation that is mild . The patient's most recent echocardiogram result is listed below. We will manage the valvular abnormality by seen in 2022 at echo at o[facility]    No echocardiogram results found for the past 12 months     Mild mitral regurgitation  Echocardiogram with evidence of mitral regurgitation that is mild . The patient's most recent echocardiogram result is listed below. We will manage the valvular abnormality by seen in 2022 on echo at [facility]    No echocardiogram results found for the past 12 months     Chronic diastolic heart failure  Echo in 2022 with EF 55% and diastolic dysfunction. No acute issues      History of carotid stenosis  Per cards notes 33% right and 50% left      Hyponatremia  Hyponatremia is likely due to suspect intake but trend . The patient's most recent sodium results are listed below.  Recent Labs     09/28/24  1610 09/29/24  0458 09/30/24  0523   * 129* 134*       Plan  - Correct the sodium by 4-6mEq in 24 hours.   - Obtain the following studies: Urine sodium, urine osmolality, serum osmolality.  - Will treat the hyponatremia with IVF  - Monitor sodium Daily.   - Patient hyponatremia is  monitoring trend  -improving 129 to 134    HTN (hypertension)  -Pt. Not taking medications prior to recent admission, had previously been prescribed amlodipine, coreg, hctz in 2022. Restart previously prescribed med amlodipine at 5 mg. Monitor and titrate meds as needed and imrpoving now, with prn hydralazine as in ER was 180s-200 systolic    Advanced dementia  -Pt. Alert to person  "only, recently placed in NH one day prior to fall. CT Head at [facility] 9/18 "Diffuse cerebral volume loss with medial temporal lobe predilection in a pattern concerning for underlying Alzheimer's dementia"  -Delirium precautions, continue home namenda      VTE Risk Mitigation (From admission, onward)           Ordered     apixaban tablet 2.5 mg  2 times daily         09/29/24 0926     IP VTE HIGH RISK PATIENT  Once         09/29/24 0602     Place sequential compression device  Until discontinued         09/29/24 0602     Place sequential compression device  Until discontinued         09/28/24 2132                    Discharge Planning   CHRIS: 10/2/2024     Code Status: Full Code   Is the patient medically ready for discharge?:     Reason for patient still in hospital (select all that apply): Patient trending condition                     Amie Lindsay MD  Department of [facility] Medicine   [facility] - Surgery    "

## 2024-09-30 NOTE — NURSING
Nurses Note -- 4 Eyes      9/30/2024   4:42 AM      Skin assessed during: Q Shift Change      [x] No Altered Skin Integrity Present    []Prevention Measures Documented      [] Yes- Altered Skin Integrity Present or Discovered   [] LDA Added if Not in Epic (Describe Wound)   [] New Altered Skin Integrity was Present on Admit and Documented in LDA   [] Wound Image Taken    Wound Care Consulted? No    Attending Nurse:  Blanche

## 2024-09-30 NOTE — PT/OT/SLP EVAL
"Occupational Therapy   Co-Evaluation    Name: Toña Carrero  MRN: [id number]  Admitting Diagnosis: Displaced intertrochanteric fracture of right femur, initial encounter for closed fracture  Recent Surgery: Procedure(s) (LRB):  INSERTION, INTRAMEDULLARY NORMA, FEMUR - right, earl, hana table (Right) 1 Day Post-Op    Recommendations:     Discharge Recommendations: Moderate Intensity Therapy  Discharge Equipment Recommendations:  other (see comments) (to be determined by next level of care)  Barriers to discharge:  None    Assessment:     Toña Carrero is a 79 y.o. female with a medical diagnosis of Displaced intertrochanteric fracture of right femur, initial encounter for closed fracture.  She presents with the following performance deficits affecting function: weakness, impaired endurance, impaired self care skills, impaired functional mobility, decreased lower extremity function, decreased coordination, impaired balance, decreased safety awareness, pain, decreased ROM. Pt was agreeable to therapy today but presented with a confused state. Pt completed mobility and transfers with an increased level of assistance. Pt was able to sit EOB ~10mins and stand twice. She completed ADLs sitting EOB.    Rehab Prognosis: Good; patient would benefit from acute skilled OT services to address these deficits and reach maximum level of function.       Plan:     Patient to be seen daily (9/30, 10/1, 10/2 then 4 x/wk) to address the above listed problems via self-care/home management, therapeutic activities, therapeutic exercises  Plan of Care Expires: 10/30/24  Plan of Care Reviewed with: patient    Subjective     Chief Complaint: Displaced intertrochanteric fracture of right femur  Patient/Family Comments/goals: "I can do therapy today"    Occupational Profile:  Living Environment: Unable to be determined d/t cognitive state  Previous level of function: Unable to be determined d/t cognitive state  Roles and Routines: Unable to be " determined d/t cognitive state  Equipment Used at Home: rollator  Assistance upon Discharge: Unable to be determined d/t cognitive state    Pain/Comfort:  Pain Rating 1: 0/10    Patients cultural, spiritual, Orthodoxy conflicts given the current situation: no    Objective:     Communicated with: RN prior to session.  Patient found supine with telemetry, oxygen, perineural catheter, FCD, bed alarm, felder catheter upon OT entry to room.    General Precautions: Standard, fall  Orthopedic Precautions: N/A  Braces: N/A  Respiratory Status: Nasal cannula, flow 0.5 L/min    Occupational Performance:    Bed Mobility:    Patient completed Supine to Sit with maximal assistance  Patient completed Sit to Supine with maximal assistance    Functional Mobility/Transfers:  Patient completed Sit <> Stand Transfer with maximal assistance and of 2 persons  with  hand-held assist and rolling walker   Functional Mobility: Pt completed x2 sit>stand transfers once with HHA and one with RW. Pt unable to take steps at this time d/t pain.     Activities of Daily Living:  Grooming: stand by assistance to complete face washing sitting EOB.    Cognitive/Visual Perceptual:  Cognitive/Psychosocial Skills:     -       Oriented to: Person (name only)  -       Follows Commands/attention:Follows one-step commands  -       Memory: Impaired STM, Impaired LTM, and Poor immediate recall  -       Safety awareness/insight to disability: impaired     AMPAC 6 Click ADL:  AMPAC Total Score: 12    Treatment & Education:  Pt was educated on role of OT and POC.  Pt was educated on safe mobility and the use of her call button to alter nursing.   Pt verbalized understanding of all education.     Patient left supine with all lines intact, call button in reach, and RN notified    GOALS:   Multidisciplinary Problems       Occupational Therapy Goals          Problem: Occupational Therapy    Goal Priority Disciplines Outcome Interventions   Occupational Therapy Goal      OT, PT/OT Progressing    Description: Goals to be met by: 10/30/24     Patient will increase functional independence with ADLs by performing:    UE Dressing with Modified [place].  LE Dressing with Modified [place].  Grooming while standing at sink with Modified [place].  Toileting from toilet with Modified [place] for hygiene and clothing management.   Bathing from  shower chair/bench with Modified [place].  Toilet transfer to toilet with Modified [place].  Pt will be AxOx x4.                           History:     History reviewed. No pertinent past medical history.      Past Surgical History:   Procedure Laterality Date    INTRAMEDULLARY RODDING OF FEMUR Right 9/29/2024    Procedure: INSERTION, INTRAMEDULLARY NORMA, FEMUR - right, earl, hana table;  Surgeon: Veronica Jimenez MD;  Location: [facility] OR [address] [place] [address];  Service: Orthopedics;  Laterality: Right;       Time Tracking:     OT Date of Treatment: 09/30/24  OT Start Time: 1115  OT Stop Time: 1140  OT Total Time (min): 25 min    Billable Minutes:Evaluation 10  Self Care/Home Management 15    9/30/2024

## 2024-09-30 NOTE — PLAN OF CARE
"Called by nursing as daughter wanted to talk and called daughter who had concerns as her mom was less responsive and more sleepy. Nsring reported was alert in afternon and therapy reported was alert around lunch time for session.  Received oxy at 8 am and stopped by wallace team for drowsines reproted. On my exam around 9 am she was awake and eating breakfast.  Received robaxin aroudn 2 pm as changed to inez 750 robaxin by pain team then.  On exam opened eyes but difficult to stay open, respodned to pain but very sedated to myself, nurse rylie and charge nurse sundar at bedside as well as daughter. Gave narcan x 1 and patient immediately woke up with vast change with eye opening, talking to team, sayign she's ready to eat dinner. Saying "sorry I didn't know I was so sleepy. I'm okay I'm okay" to daughter who was upset about events but was very relieved with rapid improvement as suspicion was for med induced. Unclear if was oxy from earlier still in system vs robaxin but time associated with robaxin and change in mental status.  Clarence anesthesia and talked to vamsi on call for anesthesia and relayed events, he recs to make it PRN and dec dose to 500.  Narcan added PRN as let daughter know can sometimes linger in system and require a 2nd dose but extremely rapid improvement after dose given now  " ----- Message from Ludwin Malave MD sent at 2/3/2022  6:58 AM CST -----  Regarding: FW:   FBS elevated bit no DM yet. TH elevated.  Strongly recommend low processed carb diet,  low cholesterol diet,  wt loss, regular exercise at least 30 mins 5 days a week. Rest of labs all normal,  thanks  ----- Message -----  From: Lab, Background User  Sent: 2/2/2022  11:00 AM CST  To: ANDRE Sheridan Prac Result Pool

## 2024-09-30 NOTE — PLAN OF CARE
Pt free of trauma, falls, and injury. Pt VSS and afebrile throughout shift. Pt free of skin breakdown. Pt neurovascular checks are intact. Pt dressing is clean, dry, and intact. Stevenson intact Pt has call light in reach, bed alarm on, bed brakes on, side rails up x2, bed in low position, SCDs on, Pt lying in bed in no distress.      Problem: Skin Injury Risk Increased  Goal: Skin Health and Integrity  Outcome: Progressing     Problem: Infection  Goal: Absence of Infection Signs and Symptoms  Outcome: Progressing     Problem: Adult Inpatient Plan of Care  Goal: Plan of Care Review  Outcome: Progressing  Goal: Patient-Specific Goal (Individualized)  Outcome: Progressing  Goal: Absence of Hospital-Acquired Illness or Injury  Outcome: Progressing  Goal: Optimal Comfort and Wellbeing  Outcome: Progressing  Goal: Readiness for Transition of Care  Outcome: Progressing     Problem: Hip Fracture Medical Management  Goal: Optimal Coping with Change in Health Status  Outcome: Progressing  Goal: Absence of Bleeding  Outcome: Progressing  Goal: Effective Bowel Elimination  Outcome: Progressing  Goal: Baseline Cognitive Function Maintained  Outcome: Progressing  Goal: Absence of Embolism  Outcome: Progressing  Goal: Fracture Stability  Outcome: Progressing  Goal: Optimal Functional Performance  Outcome: Progressing  Goal: Pain Control and Function  Outcome: Progressing  Goal: Effective Urinary Elimination  Outcome: Progressing     Problem: Surgery Nonspecified  Goal: Absence of Bleeding  Outcome: Progressing  Goal: Effective Bowel Elimination  Outcome: Progressing  Goal: Fluid and Electrolyte Balance  Outcome: Progressing  Goal: Blood Glucose Level Within Targeted Range  Outcome: Progressing  Goal: Absence of Infection Signs and Symptoms  Outcome: Progressing  Goal: Anesthesia/Sedation Recovery  Outcome: Progressing  Goal: Optimal Pain Control and Function  Outcome: Progressing  Goal: Nausea and Vomiting Relief  Outcome:  Progressing  Goal: Effective Urinary Elimination  Outcome: Progressing  Goal: Effective Oxygenation and Ventilation  Outcome: Progressing     Problem: Anesthesia/Analgesia, Neuraxial  Goal: Safe, Effective Infusion Delivery  Outcome: Progressing  Goal: Absence of Infection Signs and Symptoms  Outcome: Progressing  Goal: Nausea and Vomiting Relief  Outcome: Progressing  Goal: Effective Pain Control  Outcome: Progressing  Goal: Effective Oxygenation and Ventilation  Outcome: Progressing  Goal: Baseline Motor Function  Outcome: Progressing  Goal: Effective Urinary Elimination  Outcome: Progressing     Problem: Fall Injury Risk  Goal: Absence of Fall and Fall-Related Injury  Outcome: Progressing     Problem: Wound  Goal: Optimal Coping  Outcome: Progressing  Goal: Optimal Functional Ability  Outcome: Progressing  Goal: Absence of Infection Signs and Symptoms  Outcome: Progressing  Goal: Improved Oral Intake  Outcome: Progressing  Goal: Optimal Pain Control and Function  Outcome: Progressing  Goal: Skin Health and Integrity  Outcome: Progressing  Goal: Optimal Wound Healing  Outcome: Progressing

## 2024-09-30 NOTE — ASSESSMENT & PLAN NOTE
-sustained in mechanical fall at the [facility] memory care unit after just admitting there a day prior after being at  for placement previously.  -s/p IM nail with ortho today with wbat with PT/OT tomorrow and will assess best options post op given dementia requriing the dementia unit vs need for thearpy and which needs may be most important post op  -eliquis for 35 days with end date nov 4th  -multimodal pain meds and bowel regimen. PNC in place.   -vit D mildly low at 27 and replacement started  -felder out on POD 1- today

## 2024-09-30 NOTE — PLAN OF CARE
Pt free of trauma, falls, and injury. Pt VSS and afebrile throughout shift. Pt free of skin breakdown. Pt neurovascular checks are intact. Pt dressing is clean, dry, and intact. Stevenson intact. PNC pump intact. Pt has call light in reach, bed alarm on, bed brakes on, side rails up x2, bed in low position, SCDs on, Pt lying in bed in no distress.               Problem: Skin Injury Risk Increased  Goal: Skin Health and Integrity  Outcome: Progressing     Problem: Infection  Goal: Absence of Infection Signs and Symptoms  Outcome: Progressing     Problem: Adult Inpatient Plan of Care  Goal: Plan of Care Review  Outcome: Progressing  Goal: Patient-Specific Goal (Individualized)  Outcome: Progressing  Goal: Absence of Hospital-Acquired Illness or Injury  Outcome: Progressing  Goal: Optimal Comfort and Wellbeing  Outcome: Progressing  Goal: Readiness for Transition of Care  Outcome: Progressing     Problem: Hip Fracture Medical Management  Goal: Optimal Coping with Change in Health Status  Outcome: Progressing  Goal: Absence of Bleeding  Outcome: Progressing  Goal: Effective Bowel Elimination  Outcome: Progressing  Goal: Baseline Cognitive Function Maintained  Outcome: Progressing  Goal: Absence of Embolism  Outcome: Progressing  Goal: Fracture Stability  Outcome: Progressing  Goal: Optimal Functional Performance  Outcome: Progressing  Goal: Pain Control and Function  Outcome: Progressing  Goal: Effective Urinary Elimination  Outcome: Progressing     Problem: Surgery Nonspecified  Goal: Absence of Bleeding  Outcome: Progressing  Goal: Effective Bowel Elimination  Outcome: Progressing  Goal: Fluid and Electrolyte Balance  Outcome: Progressing  Goal: Blood Glucose Level Within Targeted Range  Outcome: Progressing  Goal: Absence of Infection Signs and Symptoms  Outcome: Progressing  Goal: Anesthesia/Sedation Recovery  Outcome: Progressing  Goal: Optimal Pain Control and Function  Outcome: Progressing  Goal: Nausea and  Vomiting Relief  Outcome: Progressing  Goal: Effective Urinary Elimination  Outcome: Progressing  Goal: Effective Oxygenation and Ventilation  Outcome: Progressing     Problem: Anesthesia/Analgesia, Neuraxial  Goal: Safe, Effective Infusion Delivery  Outcome: Progressing  Goal: Absence of Infection Signs and Symptoms  Outcome: Progressing  Goal: Nausea and Vomiting Relief  Outcome: Progressing  Goal: Effective Pain Control  Outcome: Progressing  Goal: Effective Oxygenation and Ventilation  Outcome: Progressing  Goal: Baseline Motor Function  Outcome: Progressing  Goal: Effective Urinary Elimination  Outcome: Progressing     Problem: Fall Injury Risk  Goal: Absence of Fall and Fall-Related Injury  Outcome: Progressing     Problem: Wound  Goal: Optimal Coping  Outcome: Progressing  Goal: Optimal Functional Ability  Outcome: Progressing  Goal: Absence of Infection Signs and Symptoms  Outcome: Progressing  Goal: Improved Oral Intake  Outcome: Progressing  Goal: Optimal Pain Control and Function  Outcome: Progressing  Goal: Skin Health and Integrity  Outcome: Progressing  Goal: Optimal Wound Healing  Outcome: Progressing

## 2024-09-30 NOTE — ANESTHESIA POST-OP PAIN MANAGEMENT
Acute Pain Service Progress Note    Toña Carrero is a 79 y.o., female, [id number].    Surgery:  Right femur IM ayaz    Post Op Day #: 1    Catheter type: perineural  Right SIFI PNC    Infusion type: Ropivacaine 0.2%  15mL/3hr IB. No demand bolus    Problem List:    Active Hospital Problems    Diagnosis  POA    *Displaced intertrochanteric fracture of right femur, initial encounter for closed fracture [S72.141A]  Yes    Acute blood loss anemia [D62]  No    Itchy eyes [H57.9]  Yes    Hyponatremia [E87.1]  Yes    History of carotid stenosis [Z86.79]  Not Applicable     33% right 50% left      Chronic diastolic heart failure [I50.32]  Yes    Mild mitral regurgitation [I34.0]  Yes    Mild tricuspid regurgitation [I07.1]  Yes    Vitamin D insufficiency [E55.9]  Yes    Advanced dementia [F03.C0]  Yes    HTN (hypertension) [I10]  Yes      Resolved Hospital Problems   No resolved problems to display.       Subjective:     General appearance of alert, oriented, no complaints   Pain with rest: 1    Numbers   Pain with movement: 2    Numbers   Side Effects    1. Pruritis No    2. Nausea No    3. Motor Blockade No, 2=Inability to bend knees    4. Sedation No, 2=slightly drowsy, easily aroused    Objective:     Catheter site clean, dry, intact      Vitals   Vitals:    09/30/24 0737   BP: 119/66   Pulse: 75   Resp: 20   Temp: 37.1 °C (98.7 °F)        Labs    Admission on 09/28/2024   Component Date Value Ref Range Status    HIV 1/2 Ag/Ab 09/28/2024 Non-reactive  Non-reactive Final    Hepatitis C Ab 09/28/2024 Non-reactive  Non-reactive Final    WBC 09/28/2024 11.14  3.90 - 12.70 K/uL Final    RBC 09/28/2024 4.33  4.00 - 5.40 M/uL Final    Hemoglobin 09/28/2024 12.4  12.0 - 16.0 g/dL Final    Hematocrit 09/28/2024 38.0  37.0 - 48.5 % Final    MCV 09/28/2024 88  82 - 98 fL Final    MCH 09/28/2024 28.6  27.0 - 31.0 pg Final    MCHC 09/28/2024 32.6  32.0 - 36.0 g/dL Final    RDW 09/28/2024 14.5  11.5 - 14.5 % Final    Platelets  09/28/2024 217  150 - 450 K/uL Final    MPV 09/28/2024 12.0  9.2 - 12.9 fL Final    Immature Granulocytes 09/28/2024 0.6 (H)  0.0 - 0.5 % Final    Gran # (ANC) 09/28/2024 7.9 (H)  1.8 - 7.7 K/uL Final    Immature Grans (Abs) 09/28/2024 0.07 (H)  0.00 - 0.04 K/uL Final    Lymph # 09/28/2024 1.9  1.0 - 4.8 K/uL Final    Mono # 09/28/2024 1.1 (H)  0.3 - 1.0 K/uL Final    Eos # 09/28/2024 0.1  0.0 - 0.5 K/uL Final    Baso # 09/28/2024 0.09  0.00 - 0.20 K/uL Final    nRBC 09/28/2024 0  0 /100 WBC Final    Gran % 09/28/2024 71.3  38.0 - 73.0 % Final    Lymph % 09/28/2024 17.0 (L)  18.0 - 48.0 % Final    Mono % 09/28/2024 9.5  4.0 - 15.0 % Final    Eosinophil % 09/28/2024 0.8  0.0 - 8.0 % Final    Basophil % 09/28/2024 0.8  0.0 - 1.9 % Final    Differential Method 09/28/2024 Automated   Final    Sodium 09/28/2024 129 (L)  136 - 145 mmol/L Final    Potassium 09/28/2024 3.9  3.5 - 5.1 mmol/L Final    Chloride 09/28/2024 100  95 - 110 mmol/L Final    CO2 09/28/2024 19 (L)  23 - 29 mmol/L Final    Glucose 09/28/2024 104  70 - 110 mg/dL Final    BUN 09/28/2024 16  8 - 23 mg/dL Final    Creatinine 09/28/2024 0.8  0.5 - 1.4 mg/dL Final    Calcium 09/28/2024 9.3  8.7 - 10.5 mg/dL Final    Total Protein 09/28/2024 6.8  6.0 - 8.4 g/dL Final    Albumin 09/28/2024 3.4 (L)  3.5 - 5.2 g/dL Final    Total Bilirubin 09/28/2024 0.5  0.1 - 1.0 mg/dL Final    Alkaline Phosphatase 09/28/2024 136 (H)  55 - 135 U/L Final    AST 09/28/2024 28  10 - 40 U/L Final    ALT 09/28/2024 13  10 - 44 U/L Final    eGFR 09/28/2024 >60.0  >60 mL/min/1.73 m^2 Final    Anion Gap 09/28/2024 10  8 - 16 mmol/L Final    QRS Duration 09/28/2024 80  ms Final    OHS QTC Calculation 09/28/2024 448  ms Final    Prothrombin Time 09/28/2024 10.6  9.0 - 12.5 sec Final    INR 09/28/2024 1.0  0.8 - 1.2 Final    Group & Rh 09/28/2024 O POS   Final    Indirect Roland 09/28/2024 NEG   Final    Specimen Outdate 09/28/2024 10/01/2024 23:59   Final    Magnesium 09/28/2024 1.7   1.6 - 2.6 mg/dL Final    Phosphorus 09/28/2024 3.1  2.7 - 4.5 mg/dL Final    Transferrin 09/28/2024 306  200 - 375 mg/dL Final    Prealbumin 09/28/2024 15 (L)  20 - 43 mg/dL Final    Vit D, 25-Hydroxy 09/28/2024 27 (L)  30 - 96 ng/mL Final    Hemoglobin A1C 09/28/2024 5.2  4.0 - 5.6 % Final    Estimated Avg Glucose 09/28/2024 103  68 - 131 mg/dL Final    Specimen UA 09/29/2024 Urine, Catheterized   Final    Color, UA 09/29/2024 Yellow  Yellow, Straw, [place] Final    Appearance, UA 09/29/2024 Hazy (A)  Clear Final    pH, UA 09/29/2024 6.0  5.0 - 8.0 Final    Specific Gravity, UA 09/29/2024 >1.030 (A)  1.005 - 1.030 Final    Protein, UA 09/29/2024 1+ (A)  Negative Final    Glucose, UA 09/29/2024 Negative  Negative Final    Ketones, UA 09/29/2024 Trace (A)  Negative Final    Bilirubin (UA) 09/29/2024 Negative  Negative Final    Occult Blood UA 09/29/2024 1+ (A)  Negative Final    Nitrite, UA 09/29/2024 Negative  Negative Final    Leukocytes, UA 09/29/2024 3+ (A)  Negative Final    UNIT NUMBER 09/28/2024 [id number]   Final    Product Code 09/28/2024 [id number]   Final    DISPENSE STATUS 09/28/2024 RETURNED   Final    CODING SYSTEM 09/28/2024 [id number]   Final    Unit Blood Type Code 09/28/2024 5100   Final    Unit Blood Type 09/28/2024 O POS   Final    Unit Expiration 09/28/2024 [id number]   Final    CROSSMATCH INTERPRETATION 09/28/2024 Compatible   Final    UNIT NUMBER 09/28/2024 [id number]   Final    Product Code 09/28/2024 [id number]   Final    DISPENSE STATUS 09/28/2024 RETURNED   Final    CODING SYSTEM 09/28/2024 [id number]   Final    Unit Blood Type Code 09/28/2024 5100   Final    Unit Blood Type 09/28/2024 O POS   Final    Unit Expiration 09/28/2024 [id number]   Final    CROSSMATCH INTERPRETATION 09/28/2024 Compatible   Final    WBC 09/29/2024 10.42  3.90 - 12.70 K/uL Final    RBC 09/29/2024 4.19  4.00 - 5.40 M/uL Final    Hemoglobin 09/29/2024 11.7 (L)  12.0 - 16.0 g/dL Final    Hematocrit 09/29/2024 36.8 (L)   37.0 - 48.5 % Final    MCV 09/29/2024 88  82 - 98 fL Final    MCH 09/29/2024 27.9  27.0 - 31.0 pg Final    MCHC 09/29/2024 31.8 (L)  32.0 - 36.0 g/dL Final    RDW 09/29/2024 14.5  11.5 - 14.5 % Final    Platelets 09/29/2024 322  150 - 450 K/uL Final    MPV 09/29/2024 9.4  9.2 - 12.9 fL Final    Immature Granulocytes 09/29/2024 0.6 (H)  0.0 - 0.5 % Final    Gran # (ANC) 09/29/2024 6.8  1.8 - 7.7 K/uL Final    Immature Grans (Abs) 09/29/2024 0.06 (H)  0.00 - 0.04 K/uL Final    Lymph # 09/29/2024 2.2  1.0 - 4.8 K/uL Final    Mono # 09/29/2024 1.2 (H)  0.3 - 1.0 K/uL Final    Eos # 09/29/2024 0.1  0.0 - 0.5 K/uL Final    Baso # 09/29/2024 0.07  0.00 - 0.20 K/uL Final    nRBC 09/29/2024 0  0 /100 WBC Final    Gran % 09/29/2024 65.1  38.0 - 73.0 % Final    Lymph % 09/29/2024 21.5  18.0 - 48.0 % Final    Mono % 09/29/2024 11.2  4.0 - 15.0 % Final    Eosinophil % 09/29/2024 0.9  0.0 - 8.0 % Final    Basophil % 09/29/2024 0.7  0.0 - 1.9 % Final    Differential Method 09/29/2024 Automated   Final    Magnesium 09/29/2024 1.7  1.6 - 2.6 mg/dL Final    Phosphorus 09/29/2024 3.7  2.7 - 4.5 mg/dL Final    Sodium 09/29/2024 129 (L)  136 - 145 mmol/L Final    Potassium 09/29/2024 4.0  3.5 - 5.1 mmol/L Final    Chloride 09/29/2024 102  95 - 110 mmol/L Final    CO2 09/29/2024 19 (L)  23 - 29 mmol/L Final    Glucose 09/29/2024 108  70 - 110 mg/dL Final    BUN 09/29/2024 18  8 - 23 mg/dL Final    Creatinine 09/29/2024 0.8  0.5 - 1.4 mg/dL Final    Calcium 09/29/2024 9.3  8.7 - 10.5 mg/dL Final    Total Protein 09/29/2024 6.7  6.0 - 8.4 g/dL Final    Albumin 09/29/2024 3.3 (L)  3.5 - 5.2 g/dL Final    Total Bilirubin 09/29/2024 0.5  0.1 - 1.0 mg/dL Final    Alkaline Phosphatase 09/29/2024 141 (H)  55 - 135 U/L Final    AST 09/29/2024 29  10 - 40 U/L Final    ALT 09/29/2024 12  10 - 44 U/L Final    eGFR 09/29/2024 >60.0  >60 mL/min/1.73 m^2 Final    Anion Gap 09/29/2024 8  8 - 16 mmol/L Final    Osmolality, Urine 09/30/2024 589  50 - 1200  mOsm/kg Final    Sodium, Urine 09/30/2024 <10 (L)  20 - 250 mmol/L Final    WBC 09/30/2024 10.80  3.90 - 12.70 K/uL Final    RBC 09/30/2024 3.52 (L)  4.00 - 5.40 M/uL Final    Hemoglobin 09/30/2024 9.8 (L)  12.0 - 16.0 g/dL Final    Hematocrit 09/30/2024 30.6 (L)  37.0 - 48.5 % Final    MCV 09/30/2024 87  82 - 98 fL Final    MCH 09/30/2024 27.8  27.0 - 31.0 pg Final    MCHC 09/30/2024 32.0  32.0 - 36.0 g/dL Final    RDW 09/30/2024 14.9 (H)  11.5 - 14.5 % Final    Platelets 09/30/2024 293  150 - 450 K/uL Final    MPV 09/30/2024 9.4  9.2 - 12.9 fL Final    Immature Granulocytes 09/30/2024 0.6 (H)  0.0 - 0.5 % Final    Gran # (ANC) 09/30/2024 7.5  1.8 - 7.7 K/uL Final    Immature Grans (Abs) 09/30/2024 0.07 (H)  0.00 - 0.04 K/uL Final    Lymph # 09/30/2024 1.8  1.0 - 4.8 K/uL Final    Mono # 09/30/2024 1.4 (H)  0.3 - 1.0 K/uL Final    Eos # 09/30/2024 0.1  0.0 - 0.5 K/uL Final    Baso # 09/30/2024 0.04  0.00 - 0.20 K/uL Final    nRBC 09/30/2024 0  0 /100 WBC Final    Gran % 09/30/2024 69.4  38.0 - 73.0 % Final    Lymph % 09/30/2024 16.4 (L)  18.0 - 48.0 % Final    Mono % 09/30/2024 12.6  4.0 - 15.0 % Final    Eosinophil % 09/30/2024 0.6  0.0 - 8.0 % Final    Basophil % 09/30/2024 0.4  0.0 - 1.9 % Final    Differential Method 09/30/2024 Automated   Final    Magnesium 09/30/2024 1.7  1.6 - 2.6 mg/dL Final    Phosphorus 09/30/2024 3.3  2.7 - 4.5 mg/dL Final    Sodium 09/30/2024 134 (L)  136 - 145 mmol/L Final    Potassium 09/30/2024 4.0  3.5 - 5.1 mmol/L Final    Chloride 09/30/2024 106  95 - 110 mmol/L Final    CO2 09/30/2024 21 (L)  23 - 29 mmol/L Final    Glucose 09/30/2024 96  70 - 110 mg/dL Final    BUN 09/30/2024 19  8 - 23 mg/dL Final    Creatinine 09/30/2024 0.9  0.5 - 1.4 mg/dL Final    Calcium 09/30/2024 8.6 (L)  8.7 - 10.5 mg/dL Final    Total Protein 09/30/2024 6.1  6.0 - 8.4 g/dL Final    Albumin 09/30/2024 3.0 (L)  3.5 - 5.2 g/dL Final    Total Bilirubin 09/30/2024 0.4  0.1 - 1.0 mg/dL Final    Alkaline  Phosphatase 09/30/2024 110  55 - 135 U/L Final    AST 09/30/2024 37  10 - 40 U/L Final    ALT 09/30/2024 15  10 - 44 U/L Final    eGFR 09/30/2024 >60.0  >60 mL/min/1.73 m^2 Final    Anion Gap 09/30/2024 7 (L)  8 - 16 mmol/L Final    RBC, UA 09/29/2024 20 (H)  0 - 4 /hpf Final    WBC, UA 09/29/2024 >100 (H)  0 - 5 /hpf Final    WBC Clumps, UA 09/29/2024 Few (A)  None-Rare Final    Bacteria 09/29/2024 None  None-Occ /hpf Final    Squam Epithel, UA 09/29/2024 7  /hpf Final    Hyaline Casts, UA 09/29/2024 0  0-1/lpf /lpf Final    Microscopic Comment 09/29/2024 SEE COMMENT   Final        Meds   Current Facility-Administered Medications   Medication Dose Route Frequency Provider Last Rate Last Admin    0.9%  NaCl infusion (for blood administration)   Intravenous Q24H PRN MELANIE Arvizu MD        0.9%  NaCl infusion   Intravenous Continuous Amie Lindsay  mL/hr at 09/30/24 0116 Rate Verify at 09/30/24 0116    acetaminophen tablet 1,000 mg  1,000 mg Oral Q6H Ryne Hair MD   1,000 mg at 09/30/24 0558    amLODIPine tablet 5 mg  5 mg Oral Daily Ryne Hair MD   5 mg at 09/28/24 2210    apixaban tablet 2.5 mg  2.5 mg Oral BID MELANIE Arvizu MD   2.5 mg at 09/29/24 2015    artificial tears 0.5 % ophthalmic solution 1 drop  1 drop Both Eyes QID PRN Amie Lindsay MD   1 drop at 09/29/24 2013    bisacodyL suppository 10 mg  10 mg Rectal Daily PRN Ryne Hair MD        ciprofloxacin HCl 0.3 % ophthalmic solution 2 drop  2 drop Right Eye Q2H Dhaval Merchant MD   2 drop at 09/30/24 0559    dextrose 10% bolus 125 mL 125 mL  12.5 g Intravenous PRN Dom Wang MD        dextrose 10% bolus 250 mL 250 mL  25 g Intravenous PRN Dom Wang MD        EScitalopram oxalate tablet 10 mg  10 mg Oral Daily Ryne Hair MD        fentaNYL 50 mcg/mL injection 25 mcg  25 mcg Intravenous Q5 Min PRN Ryne Hair MD        glucagon (human recombinant) injection 1 mg  1 mg Intramuscular PRN  Dom Wang MD        hydrALAZINE tablet 50 mg  50 mg Oral Q8H PRN Ryne Hair MD   50 mg at 09/28/24 2349    LIDOcaine (PF) 10 mg/ml (1%) injection 10 mg  1 mL Intradermal On Call Procedure Ryne Hair MD        melatonin tablet 6 mg  6 mg Oral Nightly PRN Ryne Hair MD   6 mg at 09/29/24 2014    memantine tablet 5 mg  5 mg Oral Daily Ryne Hair MD        morphine injection 2 mg  2 mg Intravenous Q3H PRN Ryne Hair MD   2 mg at 09/28/24 2306    mupirocin 2 % ointment 1 g  1 g Nasal On Call Procedure Ryne Hair MD        mupirocin 2 % ointment 1 g  1 g Nasal BID Ryen Hair MD   1 g at 09/29/24 2013    mupirocin 2 % ointment   Nasal On Call Procedure MELANIE Arvizu MD   Given at 09/29/24 0653    ondansetron injection 4 mg  4 mg Intravenous Q12H PRN Ryne Hair MD        oxyCODONE immediate release tablet 10 mg  10 mg Oral Q3H PRN Ryne Hair MD   10 mg at 09/29/24 2015    oxyCODONE immediate release tablet 5 mg  5 mg Oral Q3H PRN Ryne Hair MD        pregabalin capsule 75 mg  75 mg Oral QHS Ryne Hair MD   75 mg at 09/29/24 2015    ropivacaine 0.2% Perineural Pump infusion 500 ML   Perineural Continuous Savanna Ortiz MD   New Bag at 09/29/24 1034    sodium chloride 0.9% flush 10 mL  10 mL Intravenous PRN MELANIE Arvizu MD        sodium chloride 0.9% flush 10 mL  10 mL Intravenous PRN Ryne Hair MD        tranexamic acid (CYKLOKAPRON) 1,000 mg in 0.9% NaCl 100 mL IVPB (MB+)  1,000 mg Intravenous On Call Procedure MELANIE Arvizu MD        tranexamic acid (CYKLOKAPRON) 3,000 mg in 0.9% NaCl SolP 100 mL  3,000 mg Irrigation On Call Procedure Ryne Hair MD        vitamin D 1000 units tablet 1,000 Units  1,000 Units Oral Daily Amie Lindsay MD            Anticoagulant dose Eliquis 2.5 BID    Assessment:    Toña Carrero is a 80 YO female with a PMH significant for advanced dementia and HTN s/p right femur intramedullary ayaz  placement after a mechanical fall at [facility]. Today, patient is a bit disoriented with difficulty opening her eyes. We modified her pain regimen so she's less sedated. Otherwise, patient reports that she is doing well overall, no complaints.      Pain control adequate    Plan:    Tylenol 1000mg Q6H x 8 doses  Robaxin 750mg Q8H       Modify present therapy to decrease side effects - discontinued Lyrica 75mg QHS, Oxycodone 5mg and 10mg Q3H    Thank you for involving us in her care. We will continue to follow. Please reach out to the Acute Pain Service with any additional questions or concerns.    Melissa Cerrato MD  Acute Pain Service, PGY-1  [facility]  09/30/2024.8:18 AM

## 2024-09-30 NOTE — ADDENDUM NOTE
Addendum  created 09/30/24 1224 by Marcy Chaudhari MD    Charge Capture section accepted, Cosign clinical note with attestation

## 2024-09-30 NOTE — SUBJECTIVE & OBJECTIVE
Principal Problem:Displaced intertrochanteric fracture of right femur, initial encounter for closed fracture    Principal Orthopedic Problem: as above; s/p IMN 9/29    Interval History: POD-1. Pt seen and examined at bedside. VSS, afebrile.  NAEON. Reported improved pain of right hip. Reports no new symptoms. Denies fevers, chills, N/V. Will work with PT today.      Review of patient's allergies indicates:  No Known Allergies    Current Facility-Administered Medications   Medication    0.9%  NaCl infusion (for blood administration)    0.9%  NaCl infusion    acetaminophen tablet 1,000 mg    amLODIPine tablet 5 mg    apixaban tablet 2.5 mg    artificial tears 0.5 % ophthalmic solution 1 drop    bisacodyL suppository 10 mg    ciprofloxacin HCl 0.3 % ophthalmic solution 2 drop    dextrose 10% bolus 125 mL 125 mL    dextrose 10% bolus 250 mL 250 mL    EScitalopram oxalate tablet 10 mg    fentaNYL 50 mcg/mL injection 25 mcg    glucagon (human recombinant) injection 1 mg    hydrALAZINE tablet 50 mg    LIDOcaine (PF) 10 mg/ml (1%) injection 10 mg    melatonin tablet 6 mg    memantine tablet 5 mg    morphine injection 2 mg    mupirocin 2 % ointment 1 g    mupirocin 2 % ointment 1 g    mupirocin 2 % ointment    ondansetron injection 4 mg    oxyCODONE immediate release tablet 10 mg    oxyCODONE immediate release tablet 5 mg    pregabalin capsule 75 mg    ropivacaine 0.2% Perineural Pump infusion 500 ML    sodium chloride 0.9% flush 10 mL    sodium chloride 0.9% flush 10 mL    tranexamic acid (CYKLOKAPRON) 1,000 mg in 0.9% NaCl 100 mL IVPB (MB+)    tranexamic acid (CYKLOKAPRON) 3,000 mg in 0.9% NaCl SolP 100 mL    vitamin D 1000 units tablet 1,000 Units     Objective:     Vital Signs (Most Recent):  Temp: 97.6 °F (36.4 °C) (09/30/24 0329)  Pulse: 79 (09/30/24 0329)  Resp: 18 (09/30/24 0329)  BP: 134/63 (09/30/24 0329)  SpO2: (!) 92 % (09/29/24 2323) Vital Signs (24h Range):  Temp:  [96.4 °F (35.8 °C)-97.9 °F (36.6 °C)] 97.6 °F  "(36.4 °C)  Pulse:  [59-79] 79  Resp:  [12-25] 18  SpO2:  [92 %-100 %] 92 %  BP: (110-141)/(53-67) 134/63     Weight: 61.2 kg (134 lb 14.7 oz)  Height: 5' 3" (160 cm)  Body mass index is 23.9 kg/m².      Intake/Output Summary (Last 24 hours) at 9/30/2024 0601  Last data filed at 9/30/2024 0116  Gross per 24 hour   Intake 2674.13 ml   Output 350 ml   Net 2324.13 ml        Ortho/SPM Exam  NAD, resting comfortably in bed  AAOx3  No increased WOB  Extremities WWP    RLE    Dressing C/D/I   Appropriate post-op swelling, erythema.   Compartments soft/compressible  SILT throughout  Motor intact TA/EHL/Gastroc/FHL   DP pulse 2+. Brisk cap refill             Significant Labs: CBC:   Recent Labs   Lab 09/28/24  1610 09/29/24  0458 09/30/24  0524   WBC 11.14 10.42 10.80   HGB 12.4 11.7* 9.8*   HCT 38.0 36.8* 30.6*    322 293     CMP:   Recent Labs   Lab 09/28/24  1610 09/29/24  0458 09/30/24  0523   * 129* 134*   K 3.9 4.0 4.0    102 106   CO2 19* 19* 21*    108 96   BUN 16 18 19   CREATININE 0.8 0.8 0.9   CALCIUM 9.3 9.3 8.6*   PROT 6.8 6.7 6.1   ALBUMIN 3.4* 3.3* 3.0*   BILITOT 0.5 0.5 0.4   ALKPHOS 136* 141* 110   AST 28 29 37   ALT 13 12 15   ANIONGAP 10 8 7*     All pertinent labs within the past 24 hours have been reviewed.    Significant Imaging: I have reviewed and interpreted all pertinent imaging results/findings.  "

## 2024-09-30 NOTE — PLAN OF CARE
[facility]  Initial Discharge Assessment       Primary Care Provider: No, Primary Doctor    Admission Diagnosis: Trauma [T14.90XA]  Fall [W19.XXXA]  Closed comminuted intertrochanteric fracture of right femur, initial encounter [S72.141A]    Admission Date: 9/28/2024  Expected Discharge Date: 10/2/2024    Transition of Care Barriers: None    Payor: MEDICARE / Plan: MEDICARE PART A & B / Product Type: Government /     Extended Emergency Contact Information  Primary Emergency Contact: JAMIE STERN  Mobile Phone: [phone]  Relation: Daughter  Preferred language: English   needed? No    Discharge Plan A: Assisted Living ([facility])  Discharge Plan B: Skilled Nursing Facility    No Pharmacies Listed    Initial Assessment (most recent)       Adult Discharge Assessment - 09/30/24 1354          Discharge Assessment    Assessment Type Discharge Planning Assessment     Confirmed/corrected address, phone number and insurance Yes     Confirmed Demographics Correct on Facesheet     Source of Information family   Alec    Communicated CHRIS with patient/caregiver Yes     People in Home spouse     Equipment Currently Used at Home rollator     Do you currently have service(s) that help you manage your care at home? No     Do you take prescription medications? Yes     Do you have prescription coverage? Yes     Do you have any problems affording any of your prescribed medications? No     Is the patient taking medications as prescribed? yes     How do you get to doctors appointments? family or friend will provide     Are you on dialysis? No     Do you take coumadin? No     Discharge Plan A Assisted Living   [facility]    Discharge Plan B Skilled Nursing Facility     DME Needed Upon Discharge  walker rolling     Discharge Plan discussed with: Adult children   daughter Lindsay Turpin    Transition of Care Barriers None                 Assessment obtained from patient daughter and  primary caregiver Dyana Obando    Patient daughter Dyana is placing patient and patient spouse in [facility] assisted Living facility. According to Dyana, this facility will provide 24 hr care and meet therapy needs in house.     [facility]  [address] [facility] [place] [address]  [phone]

## 2024-09-30 NOTE — PLAN OF CARE
Problem: Occupational Therapy  Goal: Occupational Therapy Goal  Description: Goals to be met by: 10/30/24     Patient will increase functional independence with ADLs by performing:    UE Dressing with Modified [place].  LE Dressing with Modified [place].  Grooming while standing at sink with Modified [place].  Toileting from toilet with Modified [place] for hygiene and clothing management.   Bathing from  shower chair/bench with Modified [place].  Toilet transfer to toilet with Modified [place].  Pt will be AxOx x4.      Outcome: Progressing

## 2024-09-30 NOTE — PLAN OF CARE
Problem: Skin Injury Risk Increased  Goal: Skin Health and Integrity  Outcome: Progressing     Problem: Infection  Goal: Absence of Infection Signs and Symptoms  Outcome: Progressing     Problem: Adult Inpatient Plan of Care  Goal: Plan of Care Review  Outcome: Progressing     Problem: Hip Fracture Medical Management  Goal: Optimal Coping with Change in Health Status  Outcome: Progressing  PRN medication effective for pain  Daughter at bedside . Explained plan of care, pt disoriented and redirectable. E No injury during shift, Side rails up x 2, call light by bedside.

## 2024-09-30 NOTE — PLAN OF CARE
PT evaluation complete.     Problem: Physical Therapy  Goal: Physical Therapy Goal  Description: Goals to be met by: 10/30/2024     Patient will increase functional independence with mobility by performin. Supine to sit with Minimal Assistance  2. Sit to stand transfer with Minimal Assistance  3. Bed to chair transfer with Minimal Assistance using Rolling Walker  4. Gait x 20 feet with Minimal Assistance using Rolling Walker     Outcome: Progressing

## 2024-10-01 PROBLEM — E83.39 HYPOPHOSPHATEMIA: Status: ACTIVE | Noted: 2024-10-01

## 2024-10-01 LAB
ALBUMIN SERPL BCP-MCNC: 2.6 G/DL (ref 3.5–5.2)
ALP SERPL-CCNC: 92 U/L (ref 55–135)
ALT SERPL W/O P-5'-P-CCNC: 9 U/L (ref 10–44)
ANION GAP SERPL CALC-SCNC: 6 MMOL/L (ref 8–16)
AST SERPL-CCNC: 41 U/L (ref 10–40)
BACTERIA #/AREA URNS AUTO: ABNORMAL /HPF
BASOPHILS # BLD AUTO: 0.06 K/UL (ref 0–0.2)
BASOPHILS NFR BLD: 0.6 % (ref 0–1.9)
BILIRUB SERPL-MCNC: 0.3 MG/DL (ref 0.1–1)
BILIRUB UR QL STRIP: NEGATIVE
BUN SERPL-MCNC: 19 MG/DL (ref 8–23)
CALCIUM SERPL-MCNC: 8.3 MG/DL (ref 8.7–10.5)
CAOX CRY UR QL COMP ASSIST: ABNORMAL
CHLORIDE SERPL-SCNC: 110 MMOL/L (ref 95–110)
CLARITY UR REFRACT.AUTO: CLEAR
CO2 SERPL-SCNC: 19 MMOL/L (ref 23–29)
COLOR UR AUTO: YELLOW
CREAT SERPL-MCNC: 0.7 MG/DL (ref 0.5–1.4)
DIFFERENTIAL METHOD BLD: ABNORMAL
EOSINOPHIL # BLD AUTO: 0.4 K/UL (ref 0–0.5)
EOSINOPHIL NFR BLD: 4 % (ref 0–8)
ERYTHROCYTE [DISTWIDTH] IN BLOOD BY AUTOMATED COUNT: 15.3 % (ref 11.5–14.5)
EST. GFR  (NO RACE VARIABLE): >60 ML/MIN/1.73 M^2
GLUCOSE SERPL-MCNC: 89 MG/DL (ref 70–110)
GLUCOSE UR QL STRIP: NEGATIVE
HCT VFR BLD AUTO: 26.9 % (ref 37–48.5)
HGB BLD-MCNC: 8.4 G/DL (ref 12–16)
HGB UR QL STRIP: ABNORMAL
HYALINE CASTS UR QL AUTO: 0 /LPF
IMM GRANULOCYTES # BLD AUTO: 0.06 K/UL (ref 0–0.04)
IMM GRANULOCYTES NFR BLD AUTO: 0.6 % (ref 0–0.5)
KETONES UR QL STRIP: NEGATIVE
LEUKOCYTE ESTERASE UR QL STRIP: ABNORMAL
LYMPHOCYTES # BLD AUTO: 1.9 K/UL (ref 1–4.8)
LYMPHOCYTES NFR BLD: 20.7 % (ref 18–48)
MAGNESIUM SERPL-MCNC: 1.6 MG/DL (ref 1.6–2.6)
MCH RBC QN AUTO: 27.8 PG (ref 27–31)
MCHC RBC AUTO-ENTMCNC: 31.2 G/DL (ref 32–36)
MCV RBC AUTO: 89 FL (ref 82–98)
MICROSCOPIC COMMENT: ABNORMAL
MONOCYTES # BLD AUTO: 1.1 K/UL (ref 0.3–1)
MONOCYTES NFR BLD: 11.7 % (ref 4–15)
NEUTROPHILS # BLD AUTO: 5.8 K/UL (ref 1.8–7.7)
NEUTROPHILS NFR BLD: 62.4 % (ref 38–73)
NITRITE UR QL STRIP: NEGATIVE
NRBC BLD-RTO: 0 /100 WBC
PH UR STRIP: 6 [PH] (ref 5–8)
PHOSPHATE SERPL-MCNC: 2.3 MG/DL (ref 2.7–4.5)
PLATELET # BLD AUTO: 252 K/UL (ref 150–450)
PMV BLD AUTO: 9.7 FL (ref 9.2–12.9)
POTASSIUM SERPL-SCNC: 3.6 MMOL/L (ref 3.5–5.1)
PROT SERPL-MCNC: 5.2 G/DL (ref 6–8.4)
PROT UR QL STRIP: ABNORMAL
RBC # BLD AUTO: 3.02 M/UL (ref 4–5.4)
RBC #/AREA URNS AUTO: 64 /HPF (ref 0–4)
SODIUM SERPL-SCNC: 135 MMOL/L (ref 136–145)
SP GR UR STRIP: >=1.03 (ref 1–1.03)
SQUAMOUS #/AREA URNS AUTO: 5 /HPF
URN SPEC COLLECT METH UR: ABNORMAL
WBC # BLD AUTO: 9.29 K/UL (ref 3.9–12.7)
WBC #/AREA URNS AUTO: 47 /HPF (ref 0–5)

## 2024-10-01 PROCEDURE — 11000001 HC ACUTE MED/SURG PRIVATE ROOM

## 2024-10-01 PROCEDURE — 36415 COLL VENOUS BLD VENIPUNCTURE: CPT | Performed by: HOSPITALIST

## 2024-10-01 PROCEDURE — 83735 ASSAY OF MAGNESIUM: CPT | Performed by: HOSPITALIST

## 2024-10-01 PROCEDURE — 84100 ASSAY OF PHOSPHORUS: CPT | Performed by: HOSPITALIST

## 2024-10-01 PROCEDURE — 97530 THERAPEUTIC ACTIVITIES: CPT | Mod: CO

## 2024-10-01 PROCEDURE — 25000003 PHARM REV CODE 250: Performed by: HOSPITALIST

## 2024-10-01 PROCEDURE — 85025 COMPLETE CBC W/AUTO DIFF WBC: CPT | Performed by: HOSPITALIST

## 2024-10-01 PROCEDURE — 97112 NEUROMUSCULAR REEDUCATION: CPT

## 2024-10-01 PROCEDURE — 99231 SBSQ HOSP IP/OBS SF/LOW 25: CPT | Mod: GC,,, | Performed by: SURGERY

## 2024-10-01 PROCEDURE — 25000003 PHARM REV CODE 250

## 2024-10-01 PROCEDURE — 21400001 HC TELEMETRY ROOM

## 2024-10-01 PROCEDURE — 80053 COMPREHEN METABOLIC PANEL: CPT | Performed by: HOSPITALIST

## 2024-10-01 RX ORDER — AMLODIPINE BESYLATE 10 MG/1
10 TABLET ORAL DAILY
Status: DISCONTINUED | OUTPATIENT
Start: 2024-10-02 | End: 2024-10-02 | Stop reason: HOSPADM

## 2024-10-01 RX ORDER — ACETAMINOPHEN 500 MG
1000 TABLET ORAL EVERY 8 HOURS
Status: DISCONTINUED | OUTPATIENT
Start: 2024-10-01 | End: 2024-10-02 | Stop reason: HOSPADM

## 2024-10-01 RX ORDER — CHOLECALCIFEROL (VITAMIN D3) 25 MCG
1000 TABLET ORAL DAILY
Qty: 90 TABLET | Refills: 1 | Status: SHIPPED | OUTPATIENT
Start: 2024-10-02 | End: 2024-10-02

## 2024-10-01 RX ORDER — AMLODIPINE BESYLATE 5 MG/1
5 TABLET ORAL ONCE
Status: COMPLETED | OUTPATIENT
Start: 2024-10-01 | End: 2024-10-01

## 2024-10-01 RX ORDER — AMLODIPINE BESYLATE 10 MG/1
10 TABLET ORAL DAILY
Qty: 90 TABLET | Refills: 3 | Status: SHIPPED | OUTPATIENT
Start: 2024-10-02 | End: 2024-10-02

## 2024-10-01 RX ORDER — ACETAMINOPHEN 500 MG
1000 TABLET ORAL EVERY 8 HOURS
Start: 2024-10-01

## 2024-10-01 RX ORDER — SODIUM,POTASSIUM PHOSPHATES 280-250MG
1 POWDER IN PACKET (EA) ORAL
Status: DISCONTINUED | OUTPATIENT
Start: 2024-10-01 | End: 2024-10-02 | Stop reason: HOSPADM

## 2024-10-01 RX ADMIN — AMLODIPINE BESYLATE 5 MG: 5 TABLET ORAL at 12:10

## 2024-10-01 RX ADMIN — ESCITALOPRAM OXALATE 10 MG: 10 TABLET ORAL at 08:10

## 2024-10-01 RX ADMIN — CIPROFLOXACIN 2 DROP: 3 SOLUTION OPHTHALMIC at 12:10

## 2024-10-01 RX ADMIN — CIPROFLOXACIN 2 DROP: 3 SOLUTION OPHTHALMIC at 08:10

## 2024-10-01 RX ADMIN — SODIUM CHLORIDE: 9 INJECTION, SOLUTION INTRAVENOUS at 04:10

## 2024-10-01 RX ADMIN — POTASSIUM & SODIUM PHOSPHATES POWDER PACK 280-160-250 MG 1 PACKET: 280-160-250 PACK at 03:10

## 2024-10-01 RX ADMIN — MEMANTINE HYDROCHLORIDE 5 MG: 5 TABLET ORAL at 08:10

## 2024-10-01 RX ADMIN — MUPIROCIN 1 G: 20 OINTMENT TOPICAL at 09:10

## 2024-10-01 RX ADMIN — CIPROFLOXACIN 2 DROP: 3 SOLUTION OPHTHALMIC at 06:10

## 2024-10-01 RX ADMIN — CIPROFLOXACIN 2 DROP: 3 SOLUTION OPHTHALMIC at 11:10

## 2024-10-01 RX ADMIN — HYPROMELLOSE 2910 1 DROP: 5 SOLUTION/ DROPS OPHTHALMIC at 08:10

## 2024-10-01 RX ADMIN — APIXABAN 2.5 MG: 2.5 TABLET, FILM COATED ORAL at 08:10

## 2024-10-01 RX ADMIN — POTASSIUM & SODIUM PHOSPHATES POWDER PACK 280-160-250 MG 1 PACKET: 280-160-250 PACK at 11:10

## 2024-10-01 RX ADMIN — HYDRALAZINE HYDROCHLORIDE 50 MG: 50 TABLET ORAL at 08:10

## 2024-10-01 RX ADMIN — ACETAMINOPHEN 1000 MG: 500 TABLET ORAL at 12:10

## 2024-10-01 RX ADMIN — MUPIROCIN 1 G: 20 OINTMENT TOPICAL at 08:10

## 2024-10-01 RX ADMIN — CHOLECALCIFEROL TAB 25 MCG (1000 UNIT) 1000 UNITS: 25 TAB at 08:10

## 2024-10-01 RX ADMIN — Medication 6 MG: at 08:10

## 2024-10-01 RX ADMIN — CIPROFLOXACIN 2 DROP: 3 SOLUTION OPHTHALMIC at 10:10

## 2024-10-01 RX ADMIN — ACETAMINOPHEN 1000 MG: 500 TABLET ORAL at 06:10

## 2024-10-01 RX ADMIN — POTASSIUM & SODIUM PHOSPHATES POWDER PACK 280-160-250 MG 1 PACKET: 280-160-250 PACK at 08:10

## 2024-10-01 RX ADMIN — ACETAMINOPHEN 1000 MG: 500 TABLET ORAL at 01:10

## 2024-10-01 RX ADMIN — ACETAMINOPHEN 1000 MG: 500 TABLET ORAL at 09:10

## 2024-10-01 RX ADMIN — CIPROFLOXACIN 2 DROP: 3 SOLUTION OPHTHALMIC at 03:10

## 2024-10-01 RX ADMIN — POTASSIUM & SODIUM PHOSPHATES POWDER PACK 280-160-250 MG 1 PACKET: 280-160-250 PACK at 06:10

## 2024-10-01 RX ADMIN — AMLODIPINE BESYLATE 5 MG: 5 TABLET ORAL at 08:10

## 2024-10-01 RX ADMIN — CIPROFLOXACIN 2 DROP: 3 SOLUTION OPHTHALMIC at 04:10

## 2024-10-01 RX ADMIN — CIPROFLOXACIN 2 DROP: 3 SOLUTION OPHTHALMIC at 02:10

## 2024-10-01 NOTE — ADDENDUM NOTE
Addendum  created 10/01/24 1152 by Jeff Cash MD    Charge Capture section accepted, Cosign clinical note with attestation

## 2024-10-01 NOTE — ASSESSMENT & PLAN NOTE
-Pt. Not taking medications prior to recent admission, had previously been prescribed amlodipine, coreg, hctz in 2022. Restart previously prescribed med amlodipine at 5 mg. Monitor and titrate meds as needed and imrpoving now, with prn hydralazine as in ER was 180s-200 systolic\  BP higher 10/1 so inc norvasc to 10

## 2024-10-01 NOTE — SUBJECTIVE & OBJECTIVE
Principal Problem:Displaced intertrochanteric fracture of right femur, initial encounter for closed fracture    Principal Orthopedic Problem: as above; s/p IMN 9/29    Interval History: POD-2. Pt seen and examined at bedside. VSS, afebrile.  NAEON. Reported improved pain of right hip. Reports no new symptoms. Denies fevers, chills, N/V. Worked with PT yesterday; they recommend moderate intensity therapy at discharge.      Review of patient's allergies indicates:  No Known Allergies    Current Facility-Administered Medications   Medication    0.9%  NaCl infusion (for blood administration)    0.9%  NaCl infusion    acetaminophen tablet 1,000 mg    amLODIPine tablet 5 mg    apixaban tablet 2.5 mg    artificial tears 0.5 % ophthalmic solution 1 drop    bisacodyL suppository 10 mg    ciprofloxacin HCl 0.3 % ophthalmic solution 2 drop    dextrose 10% bolus 125 mL 125 mL    dextrose 10% bolus 250 mL 250 mL    EScitalopram oxalate tablet 10 mg    fentaNYL 50 mcg/mL injection 25 mcg    glucagon (human recombinant) injection 1 mg    hydrALAZINE tablet 50 mg    LIDOcaine (PF) 10 mg/ml (1%) injection 10 mg    melatonin tablet 6 mg    memantine tablet 5 mg    methocarbamoL tablet 500 mg    mupirocin 2 % ointment 1 g    mupirocin 2 % ointment 1 g    mupirocin 2 % ointment    naloxone 0.4 mg/mL injection 0.4 mg    ondansetron injection 4 mg    ropivacaine 0.2% Perineural Pump infusion 500 ML    sodium chloride 0.9% flush 10 mL    sodium chloride 0.9% flush 10 mL    tranexamic acid (CYKLOKAPRON) 1,000 mg in 0.9% NaCl 100 mL IVPB (MB+)    tranexamic acid (CYKLOKAPRON) 3,000 mg in 0.9% NaCl SolP 100 mL    vitamin D 1000 units tablet 1,000 Units     Objective:     Vital Signs (Most Recent):  Temp: 97.7 °F (36.5 °C) (10/01/24 0408)  Pulse: 75 (10/01/24 0408)  Resp: 16 (10/01/24 0408)  BP: (!) 148/65 (10/01/24 0408)  SpO2: (!) 94 % (10/01/24 0408) Vital Signs (24h Range):  Temp:  [97.3 °F (36.3 °C)-99.4 °F (37.4 °C)] 97.7 °F (36.5  "°C)  Pulse:  [60-83] 75  Resp:  [16-20] 16  SpO2:  [93 %-96 %] 94 %  BP: (114-157)/(55-75) 148/65     Weight: 61.2 kg (134 lb 14.7 oz)  Height: 5' 3" (160 cm)  Body mass index is 23.9 kg/m².      Intake/Output Summary (Last 24 hours) at 10/1/2024 0533  Last data filed at 10/1/2024 0300  Gross per 24 hour   Intake --   Output 445 ml   Net -445 ml        Ortho/SPM Exam  NAD, resting comfortably in bed  AAOx3  No increased WOB  Extremities WWP    RLE    Dressing C/D/I   Appropriate post-op swelling, erythema.   Compartments soft/compressible  SILT throughout  Motor intact TA/EHL/Gastroc/FHL   DP pulse 2+. Brisk cap refill             Significant Labs: CBC:   Recent Labs   Lab 09/30/24  0524 10/01/24  0211   WBC 10.80 9.29   HGB 9.8* 8.4*   HCT 30.6* 26.9*    252     CMP:   Recent Labs   Lab 09/30/24  0523 10/01/24  0211   * 135*   K 4.0 3.6    110   CO2 21* 19*   GLU 96 89   BUN 19 19   CREATININE 0.9 0.7   CALCIUM 8.6* 8.3*   PROT 6.1 5.2*   ALBUMIN 3.0* 2.6*   BILITOT 0.4 0.3   ALKPHOS 110 92   AST 37 41*   ALT 15 9*   ANIONGAP 7* 6*     All pertinent labs within the past 24 hours have been reviewed.    Significant Imaging: I have reviewed and interpreted all pertinent imaging results/findings.  "

## 2024-10-01 NOTE — PLAN OF CARE
[facility]      HOME HEALTH ORDERS  FACE TO FACE ENCOUNTER    Patient Name: Toña Carrero  YOB: 1945    PCP: No, Primary Doctor   PCP Address: None  PCP Phone Number: None  PCP Fax: None    Encounter Date: 9/28/24    Admit to Home Health    Diagnoses:  Active Hospital Problems    Diagnosis  POA    *Displaced intertrochanteric fracture of right femur, initial encounter for closed fracture [S72.141A]  Yes    Hypophosphatemia [E83.39]  No    Acute blood loss anemia [D62]  No    Itchy eyes [H57.9]  Yes    Hyponatremia [E87.1]  Yes    History of carotid stenosis [Z86.79]  Not Applicable     33% right 50% left      Chronic diastolic heart failure [I50.32]  Yes    Mild mitral regurgitation [I34.0]  Yes    Mild tricuspid regurgitation [I07.1]  Yes    Vitamin D insufficiency [E55.9]  Yes    Advanced dementia [F03.C0]  Yes    HTN (hypertension) [I10]  Yes      Resolved Hospital Problems   No resolved problems to display.       Follow Up Appointments:  No future appointments.    Allergies:Review of patient's allergies indicates:  No Known Allergies    Medications: Review discharge medications with patient and family and provide education.    Current Facility-Administered Medications   Medication Dose Route Frequency Provider Last Rate Last Admin    0.9%  NaCl infusion (for blood administration)   Intravenous Q24H PRN MELANIE Arvizu MD        acetaminophen tablet 1,000 mg  1,000 mg Oral Q8H Melissa Cerrato MD   1,000 mg at 10/01/24 1300    [START ON 10/2/2024] amLODIPine tablet 10 mg  10 mg Oral Daily Amie Lindsay MD        apixaban tablet 2.5 mg  2.5 mg Oral BID MELANIE Arvizu MD   2.5 mg at 10/01/24 0813    artificial tears 0.5 % ophthalmic solution 1 drop  1 drop Both Eyes QID PRN Amie Lindsay MD   1 drop at 10/01/24 0814    bisacodyL suppository 10 mg  10 mg Rectal Daily PRN Ryne Hair MD        ciprofloxacin HCl 0.3 % ophthalmic solution 2 drop  2 drop Right Eye Q2H Mayur  MD Dhaval   2 drop at 10/01/24 1111    dextrose 10% bolus 125 mL 125 mL  12.5 g Intravenous PRN Dom Wang MD        dextrose 10% bolus 250 mL 250 mL  25 g Intravenous PRN Dom Wang MD        EScitalopram oxalate tablet 10 mg  10 mg Oral Daily Ryne Hair MD   10 mg at 10/01/24 0814    fentaNYL 50 mcg/mL injection 25 mcg  25 mcg Intravenous Q5 Min PRN Ryne Hair MD        glucagon (human recombinant) injection 1 mg  1 mg Intramuscular PRN Dom Wang MD        hydrALAZINE tablet 50 mg  50 mg Oral Q8H PRN Ryne Hair MD   50 mg at 09/28/24 2349    LIDOcaine (PF) 10 mg/ml (1%) injection 10 mg  1 mL Intradermal On Call Procedure Ryne Hair MD        melatonin tablet 6 mg  6 mg Oral Nightly PRN Ryne Hair MD   6 mg at 09/30/24 2040    memantine tablet 5 mg  5 mg Oral Daily Ryne Hair MD   5 mg at 10/01/24 0814    methocarbamoL tablet 500 mg  500 mg Oral Q8H PRN Amie Lindsay MD        mupirocin 2 % ointment 1 g  1 g Nasal On Call Procedure Ryne Hair MD        mupirocin 2 % ointment 1 g  1 g Nasal BID Ryne Hair MD   1 g at 10/01/24 0900    mupirocin 2 % ointment   Nasal On Call Procedure MELANIE Arvizu MD   Given at 09/29/24 0653    naloxone 0.4 mg/mL injection 0.4 mg  0.4 mg Intravenous PRN Amie Lindsay MD        ondansetron injection 4 mg  4 mg Intravenous Q12H PRN Ryne Hair MD        potassium, sodium phosphates 280-160-250 mg packet 1 packet  1 packet Oral QID (AC & HS) Amie Lindsay MD   1 packet at 10/01/24 1111    ropivacaine 0.2% Perineural Pump infusion 500 ML   Perineural Continuous Savanna Ortiz MD   New Bag at 09/29/24 1034    sodium chloride 0.9% flush 10 mL  10 mL Intravenous PRN MELANIE Arvizu MD        sodium chloride 0.9% flush 10 mL  10 mL Intravenous PRN Ryne Hair MD        tranexamic acid (CYKLOKAPRON) 1,000 mg in 0.9% NaCl 100 mL IVPB (MB+)  1,000 mg Intravenous On Call Procedure  MELANIE Arvizu MD        tranexamic acid (CYKLOKAPRON) 3,000 mg in 0.9% NaCl SolP 100 mL  3,000 mg Irrigation On Call Procedure Ryne Hair MD        vitamin D 1000 units tablet 1,000 Units  1,000 Units Oral Daily Amie Lindsay MD   1,000 Units at 10/01/24 0814        Medication List        START taking these medications      acetaminophen 500 MG tablet  Commonly known as: TYLENOL  Take 2 tablets (1,000 mg total) by mouth every 8 (eight) hours.     apixaban 2.5 mg Tab  Commonly known as: ELIQUIS  Take 1 tablet (2.5 mg total) by mouth 2 (two) times daily. End date 11/4/24     artificial tears 0.5 % ophthalmic solution  Commonly known as: ISOPTO TEARS  Place 1 drop into both eyes 4 (four) times daily as needed.     hydroCHLOROthiazide 12.5 MG Tab  Commonly known as: HYDRODIURIL  Take 1 tablet (12.5 mg total) by mouth once daily.     vitamin D 1000 units Tab  Commonly known as: VITAMIN D3  Take 1 tablet (1,000 Units total) by mouth once daily.            CHANGE how you take these medications      amLODIPine 10 MG tablet  Commonly known as: NORVASC  Take 1 tablet (10 mg total) by mouth once daily.  What changed:   medication strength  how much to take  when to take this            CONTINUE taking these medications      EScitalopram oxalate 10 MG tablet  Commonly known as: LEXAPRO  Take 1 tablet by mouth once daily.     memantine 5 MG Tab  Commonly known as: NAMENDA  Take 1 tablet by mouth once daily.            STOP taking these medications      aspirin 81 MG Chew                I have seen and examined this patient within the last 30 days. My clinical findings that support the need for the home health skilled services and home bound status are the following:no   Weakness/numbness causing balance and gait disturbance due to Fracture and Weakness/Debility making it taxing to leave home.  Requiring assistive device to leave home due to unsteady gait caused by  Fracture and Weakness/Debility.     Diet:    regular diet      Referrals/ Consults  Physical Therapy to evaluate and treat. Evaluate for home safety and equipment needs; Establish/upgrade home exercise program. Perform / instruct on therapeutic exercises, gait training, transfer training, and Range of Motion.  Occupational Therapy to evaluate and treat. Evaluate home environment for safety and equipment needs. Perform/Instruct on transfers, ADL training, ROM, and therapeutic exercises.    Activities:   WBAT to RLE    Nursing:   Agency to admit patient within 24 hours of hospital discharge unless specified on physician order or at patient request    SN to complete comprehensive assessment including routine vital signs. Instruct on disease process and s/s of complications to report to MD. Review/verify medication list sent home with the patient at time of discharge  and instruct patient/caregiver as needed. Frequency may be adjusted depending on start of care date.     Skilled nurse to perform up to 3 visits PRN for symptoms related to diagnosis    Notify MD if SBP > 160 or < 90; DBP > 90 or < 50; HR > 120 or < 50; Temp > 101; O2 < 88%; Other:       Ok to schedule additional visits based on staff availability and patient request on consecutive days within the home health episode.    When multiple disciplines ordered:    Start of Care occurs on Sunday - Wednesday schedule remaining discipline evaluations as ordered on separate consecutive days following the start of care.    Thursday SOC -schedule subsequent evaluations Friday and Monday the following week.     Friday - Saturday SOC - schedule subsequent discipline evaluations on consecutive days starting Monday of the following week.    For all post-discharge communication and subsequent orders please contact patient's primary care physician. If unable to reach primary care physician or do not receive response within 30 minutes, please contact [facility] on call line for clinical staff order  clarification    Miscellaneous   Routine Skin for Bedridden Patients: Instruct patient/caregiver to apply moisture barrier cream to all skin folds and wet areas in perineal area daily and after baths and all bowel movements.    Home Health Aide:  Physical Therapy Three times weekly and Occupational Therapy Three times weekly    Wound Care Orders  Keep bandages in place to RLE until orthopedics follow up. Do not immerse in water. Reinforce PRN    I certify that this patient is confined to her home and needs physical therapy and occupational therapy.

## 2024-10-01 NOTE — SUBJECTIVE & OBJECTIVE
Interval history- doing well today and alert on exam and no return of dec mental status that she had yesterday around dinner time when required narcan. She reports no pain on exam today. She is oriented to general situation. She says she doesn't think her daughter has been here today and no fam at bedside. Will pop by later this morning to see if daughter at bedside as welll as talked to her yesterady durnig rapid. Labs stable with hg 8.4. plan for the [facility] for placement.      No current facility-administered medications on file prior to encounter.     Current Outpatient Medications on File Prior to Encounter   Medication Sig    amLODIPine (NORVASC) 2.5 MG tablet Take 1 tablet by mouth once daily.    aspirin 81 MG Chew Take 81 mg by mouth once daily.    EScitalopram oxalate (LEXAPRO) 10 MG tablet Take 1 tablet by mouth once daily.    memantine (NAMENDA) 5 MG Tab Take 1 tablet by mouth once daily.     Family History    None       Tobacco Use    Smoking status: Never    Smokeless tobacco: Never   Substance and Sexual Activity    Alcohol use: Not on file    Drug use: Not on file    Sexual activity: Not on file     Review of Systems   Unable to perform ROS: Dementia     Objective:     Vital Signs (Most Recent):  Temp: 98.5 °F (36.9 °C) (10/01/24 0747)  Pulse: 74 (10/01/24 0747)  Resp: 16 (10/01/24 0747)  BP: (!) 169/82 (10/01/24 0747)  SpO2: (!) 94 % (10/01/24 0747) Vital Signs (24h Range):  Temp:  [97.4 °F (36.3 °C)-99.4 °F (37.4 °C)] 98.5 °F (36.9 °C)  Pulse:  [60-83] 74  Resp:  [16-18] 16  SpO2:  [93 %-96 %] 94 %  BP: (128-169)/(58-82) 169/82     Weight: 61.2 kg (134 lb 14.7 oz)  Body mass index is 23.9 kg/m².     Physical Exam  Vitals reviewed.   Constitutional:       General: She is not in acute distress.     Appearance: She is well-developed.      Comments: Alert and awake   HENT:      Head: Normocephalic and atraumatic.   Eyes:      Extraocular Movements: Extraocular movements intact.      Pupils: Pupils  are equal, round, and reactive to light.   Neck:      Vascular: No JVD.      Trachea: No tracheal deviation.   Cardiovascular:      Rate and Rhythm: Normal rate and regular rhythm.      Heart sounds: Murmur heard.      No friction rub. No gallop.   Pulmonary:      Effort: No respiratory distress.      Breath sounds: Normal breath sounds. No wheezing or rales.   Abdominal:      General: Bowel sounds are normal. There is no distension.      Palpations: Abdomen is soft. There is no mass.      Tenderness: There is no abdominal tenderness.   Musculoskeletal:      Cervical back: Neck supple.      Comments: Bandages to RLE.   Lymphadenopathy:      Cervical: No cervical adenopathy.   Skin:     General: Skin is warm and dry.      Findings: No rash.   Neurological:      Mental Status: She is alert. Mental status is at baseline. She is disoriented.              CRANIAL NERVES     CN III, IV, VI   Pupils are equal, round, and reactive to light.       Significant Labs: All pertinent labs within the past 24 hours have been reviewed.    Significant Imaging: I have reviewed all pertinent imaging results/findings within the past 24 hours.

## 2024-10-01 NOTE — PLAN OF CARE
10/01/24 1421   Post-Acute Status   Post-Acute Authorization Placement   Post-Acute Placement Status Referrals Sent   Discharge Plan   Discharge Plan A Skilled Nursing Facility     SW informed by CM Roldan, pt's family choice is for pt to admit to [facility] w/ 24 hour care and Therapy.    RAY sent out referrals for plan B SNFs( [facility], [facility], [facility], and [facility]. CM team to follow.    3:21 PM  SW sent home health order to ([facility].Fax # [phone]. Phone #[phone].      Josee Simmons LMSW  Case Management   [facility]   Ext. [phone]

## 2024-10-01 NOTE — PROGRESS NOTES
[facility] Medicine  Progress Note    Patient Name: Toña Carrero  MRN: [id number]  Patient Class: IP- Inpatient   Admission Date: 9/28/2024  Length of Stay: 3 days  Attending Physician: Amie Lindsay MD  Primary Care Provider: Inez, Primary Doctor        Subjective:     Principal Problem:Displaced intertrochanteric fracture of right femur, initial encounter for closed fracture        HPI:  80 yo F with PMHx advanced dementia and HTN who presents to the ED from the [facility] after a fall today with noted R hip deformity. Pt. Was recently admitted to [facility] 9/18-9/27 for worsening confusion and debility which was ultimately determined to be related to progressive dementia. Pt.  reportedly also needed placement as they both have advanced dementia and unable to care for themselves. She was discharged yesterday and placed at the [facility] assisted living [facility]. Today, paient reportedly had a fall in the dining room at the facility. She had noted deformity, EMS was contacted and reportedly wrapped pt. Pelvis in a sheet because of stability concerns. In ED, X-ray obtained revealed an impacted intertrochanteric fracture of the right femur.    Overview/Hospital Course:  No notes on file    Interval history- doing well today and alert on exam and no return of dec mental status that she had yesterday around dinner time when required narcan. She reports no pain on exam today. She is oriented to general situation. She says she doesn't think her daughter has been here today and no fam at bedside. Will pop by later this morning to see if daughter at bedside as welll as talked to her yesterady durnig rapid. Labs stable with hg 8.4. plan for the [place] for placement.      No current facility-administered medications on file prior to encounter.     Current Outpatient Medications on File Prior to Encounter   Medication Sig    amLODIPine (NORVASC) 2.5 MG tablet Take 1 tablet by mouth  once daily.    aspirin 81 MG Chew Take 81 mg by mouth once daily.    EScitalopram oxalate (LEXAPRO) 10 MG tablet Take 1 tablet by mouth once daily.    memantine (NAMENDA) 5 MG Tab Take 1 tablet by mouth once daily.     Family History    None       Tobacco Use    Smoking status: Never    Smokeless tobacco: Never   Substance and Sexual Activity    Alcohol use: Not on file    Drug use: Not on file    Sexual activity: Not on file     Review of Systems   Unable to perform ROS: Dementia     Objective:     Vital Signs (Most Recent):  Temp: 98.5 °F (36.9 °C) (10/01/24 0747)  Pulse: 74 (10/01/24 0747)  Resp: 16 (10/01/24 0747)  BP: (!) 169/82 (10/01/24 0747)  SpO2: (!) 94 % (10/01/24 0747) Vital Signs (24h Range):  Temp:  [97.4 °F (36.3 °C)-99.4 °F (37.4 °C)] 98.5 °F (36.9 °C)  Pulse:  [60-83] 74  Resp:  [16-18] 16  SpO2:  [93 %-96 %] 94 %  BP: (128-169)/(58-82) 169/82     Weight: 61.2 kg (134 lb 14.7 oz)  Body mass index is 23.9 kg/m².     Physical Exam  Vitals reviewed.   Constitutional:       General: She is not in acute distress.     Appearance: She is well-developed.      Comments: Alert and awake   HENT:      Head: Normocephalic and atraumatic.   Eyes:      Extraocular Movements: Extraocular movements intact.      Pupils: Pupils are equal, round, and reactive to light.   Neck:      Vascular: No JVD.      Trachea: No tracheal deviation.   Cardiovascular:      Rate and Rhythm: Normal rate and regular rhythm.      Heart sounds: Murmur heard.      No friction rub. No gallop.   Pulmonary:      Effort: No respiratory distress.      Breath sounds: Normal breath sounds. No wheezing or rales.   Abdominal:      General: Bowel sounds are normal. There is no distension.      Palpations: Abdomen is soft. There is no mass.      Tenderness: There is no abdominal tenderness.   Musculoskeletal:      Cervical back: Neck supple.      Comments: Bandages to RLE.   Lymphadenopathy:      Cervical: No cervical adenopathy.   Skin:     General:  Skin is warm and dry.      Findings: No rash.   Neurological:      Mental Status: She is alert. Mental status is at baseline. She is disoriented.              CRANIAL NERVES     CN III, IV, VI   Pupils are equal, round, and reactive to light.       Significant Labs: All pertinent labs within the past 24 hours have been reviewed.    Significant Imaging: I have reviewed all pertinent imaging results/findings within the past 24 hours.    Assessment/Plan:      * Displaced intertrochanteric fracture of right femur, initial encounter for closed fracture  -sustained in mechanical fall at the [facility] unit after just admitting there a day prior after being at  for placement previously.  -s/p IM nail with ortho today with wbat with PT/OT and plan for pt/ot at [facility] the Given on discharge  -eliquis for 35 days with end date nov 4th  -multimodal pain meds and bowel regimen. PNC in place. Had bad reaction to either oxy vs scheduled 750 robaxni as both given 9/30 and narcan given in PM for oversedation with rapid improevment. Discussed with pain team who rec dec dose of robaxin and change to prn. Oxy had already been dc eralier in day. No pain today  -vit D mildly low at 27 and replacement started        Hypophosphatemia  Patient has Abnormal Phosphorus: hypophosphatemia. Will continue to monitor electrolytes closely. Will replace the affected electrolytes and repeat labs to be done after interventions completed. The patient's phosphorus results have been reviewed and are listed below.  Recent Labs   Lab 10/01/24  0211   PHOS 2.3*        Itchy eyes  Refractory to eye drops, anesthesia came to see with fluorosceien ons unday and starred cipro eye drops to right eye so possible corneal irritation from surgery, not fully documented in notes but will continue. Denies itching/burning 9/30      Acute blood loss anemia  Anemia is likely due to acute blood loss which was from surgery . Most recent  hemoglobin and hematocrit are listed below.  Recent Labs     09/28/24  1610 09/29/24  0458 09/30/24  0524   HGB 12.4 11.7* 9.8*   HCT 38.0 36.8* 30.6*     Plan  - Monitor serial CBC: Daily  - Transfuse PRBC if patient becomes hemodynamically unstable, symptomatic or H/H drops below 7/21.  - Patient has not received any PRBC transfusions to date  - Patient's anemia is currently stable  -    Vitamin D insufficiency  Mildly low at 27 and replacement started      Mild tricuspid regurgitation  Echocardiogram with evidence of tricuspid regurgitation that is mild . The patient's most recent echocardiogram result is listed below. We will manage the valvular abnormality by seen in 2022 at echo at o[facility]    No echocardiogram results found for the past 12 months     Mild mitral regurgitation  Echocardiogram with evidence of mitral regurgitation that is mild . The patient's most recent echocardiogram result is listed below. We will manage the valvular abnormality by seen in 2022 on echo at OSH    No echocardiogram results found for the past 12 months     Chronic diastolic heart failure  Echo in 2022 with EF 55% and diastolic dysfunction. No acute issues      History of carotid stenosis  Per cards notes 33% right and 50% left      Hyponatremia  Hyponatremia is likely due to suspect intake but trend . The patient's most recent sodium results are listed below.  Recent Labs     09/29/24  0458 09/30/24  0523 10/01/24  0211   * 134* 135*       Plan  - Correct the sodium by 4-6mEq in 24 hours.   - Obtain the following studies: Urine sodium, urine osmolality, serum osmolality.  - Will treat the hyponatremia with IVF  - Monitor sodium Daily.   - Patient hyponatremia is  monitoring trend  -improving 129 to 134--135    HTN (hypertension)  -Pt. Not taking medications prior to recent admission, had previously been prescribed amlodipine, coreg, hctz in 2022. Restart previously prescribed med amlodipine at 5 mg. Monitor and titrate meds as  "needed and imrpoving now, with prn hydralazine as in ER was 180s-200 systolic\  BP higher 10/1 so inc norvasc to 10    Advanced dementia  -Pt. Alert to person only, recently placed in NH one day prior to fall. CT Head at [facility] 9/18 "Diffuse cerebral volume loss with medial temporal lobe predilection in a pattern concerning for underlying Alzheimer's dementia"  -Delirium precautions, continue home namenda      VTE Risk Mitigation (From admission, onward)           Ordered     apixaban tablet 2.5 mg  2 times daily         09/29/24 0926     IP VTE HIGH RISK PATIENT  Once         09/29/24 0602     Place sequential compression device  Until discontinued         09/29/24 0602     Place sequential compression device  Until discontinued         09/28/24 2132                    Discharge Planning   CHRIS: 10/2/2024     Code Status: Full Code   Is the patient medically ready for discharge?:     Reason for patient still in hospital (select all that apply): Patient trending condition  Discharge Plan A: Assisted Living ([facility])                  Amie Lindsay MD  Department of [facility] Medicine   [facility] - Surgery    "

## 2024-10-01 NOTE — PLAN OF CARE
Spoke with Saniya at [facility] regarding patient dc to their facility tomorrow.    Verified that they would be providing therapy for pt, they asked that therapy orders be added to facility orders.      [facility] uses [facility] [phone] for patient medications.     -HH referral sent to facility HH of choice [facility]  Wheelchair ordered through [facility] Dr. Leonard completed facility packet per [facility] request

## 2024-10-01 NOTE — PLAN OF CARE
Problem: Skin Injury Risk Increased  Goal: Skin Health and Integrity  Outcome: Progressing     Problem: Infection  Goal: Absence of Infection Signs and Symptoms  Outcome: Progressing     Problem: Adult Inpatient Plan of Care  Goal: Plan of Care Review  Outcome: Progressing  Goal: Patient-Specific Goal (Individualized)  Outcome: Progressing  Goal: Absence of Hospital-Acquired Illness or Injury  Outcome: Progressing  Goal: Optimal Comfort and Wellbeing  Intervention: Monitor Pain and Promote Comfort  Flowsheets (Taken 10/1/2024 0227)  Pain Management Interventions:   quiet environment facilitated   position adjusted     Problem: Adult Inpatient Plan of Care  Goal: Patient-Specific Goal (Individualized)  Outcome: Progressing     Problem: Adult Inpatient Plan of Care  Goal: Optimal Comfort and Wellbeing  Intervention: Monitor Pain and Promote Comfort  Flowsheets (Taken 10/1/2024 0227)  Pain Management Interventions:   quiet environment facilitated   position adjusted     Problem: Adult Inpatient Plan of Care  Goal: Optimal Comfort and Wellbeing  Intervention: Monitor Pain and Promote Comfort  Flowsheets (Taken 10/1/2024 0227)  Pain Management Interventions:   quiet environment facilitated   position adjusted   Remain SR on telemetry monitor. PRN medication effective. For pain  pt cannot verbalized understanding.; however can follow cues turned Q2  utilizing pillows . No injury during shift, Side rails up x 2, call light by bedside.  PNC in place , Stevenson in place and continuous fluids  monitored this shift

## 2024-10-01 NOTE — ANESTHESIA POST-OP PAIN MANAGEMENT
Acute Pain Service Progress Note    Tñoa Carrero is a 79 y.o., female, [id number].    Surgery:  Right femur IM ayaz    Post Op Day #: 2    Catheter type: perineural  Right SIFI PNC    Infusion type: Ropivacaine 0.2%  15mL/3hr IB. No demand bolus    Problem List:    Active Hospital Problems    Diagnosis  POA    *Displaced intertrochanteric fracture of right femur, initial encounter for closed fracture [S72.141A]  Yes    Hypophosphatemia [E83.39]  No    Acute blood loss anemia [D62]  No    Itchy eyes [H57.9]  Yes    Hyponatremia [E87.1]  Yes    History of carotid stenosis [Z86.79]  Not Applicable     33% right 50% left      Chronic diastolic heart failure [I50.32]  Yes    Mild mitral regurgitation [I34.0]  Yes    Mild tricuspid regurgitation [I07.1]  Yes    Vitamin D insufficiency [E55.9]  Yes    Advanced dementia [F03.C0]  Yes    HTN (hypertension) [I10]  Yes      Resolved Hospital Problems   No resolved problems to display.       Subjective:     General appearance of alert, oriented, no complaints   Pain with rest: 1    Numbers   Pain with movement: 2    Numbers   Side Effects    1. Pruritis No    2. Nausea No    3. Motor Blockade No, 1=Ability to bend knees and ankles    4. Sedation No, 2=slightly drowsy, easily aroused    Objective:     Catheter site clean, dry, intact      Vitals   Vitals:    10/01/24 0747   BP: (!) 169/82   Pulse: 74   Resp: 16   Temp: 36.9 °C (98.5 °F)        Labs    No results displayed because visit has over 200 results.           Meds   Current Facility-Administered Medications   Medication Dose Route Frequency Provider Last Rate Last Admin    0.9%  NaCl infusion (for blood administration)   Intravenous Q24H PRN MELANIE Arvizu MD        0.9%  NaCl infusion   Intravenous Continuous Amie Lindsay  mL/hr at 10/01/24 0449 New Bag at 10/01/24 0449    acetaminophen tablet 1,000 mg  1,000 mg Oral Q6H Ryne Hair MD   1,000 mg at 10/01/24 0629    acetaminophen tablet 1,000 mg   1,000 mg Oral Q8H Melissa Cerrato MD        amLODIPine tablet 5 mg  5 mg Oral Daily Ryne Hair MD   5 mg at 10/01/24 0814    apixaban tablet 2.5 mg  2.5 mg Oral BID MELANIE Arvizu MD   2.5 mg at 10/01/24 0813    artificial tears 0.5 % ophthalmic solution 1 drop  1 drop Both Eyes QID PRN Amie Lindsay MD   1 drop at 10/01/24 0814    bisacodyL suppository 10 mg  10 mg Rectal Daily PRN Ryne Hair MD        ciprofloxacin HCl 0.3 % ophthalmic solution 2 drop  2 drop Right Eye Q2H Dhaval Merchant MD   2 drop at 10/01/24 0814    dextrose 10% bolus 125 mL 125 mL  12.5 g Intravenous PRN Dom Wang MD        dextrose 10% bolus 250 mL 250 mL  25 g Intravenous PRN Dom Wang MD        EScitalopram oxalate tablet 10 mg  10 mg Oral Daily Ryne Hair MD   10 mg at 10/01/24 0814    fentaNYL 50 mcg/mL injection 25 mcg  25 mcg Intravenous Q5 Min PRN Ryne Hair MD        glucagon (human recombinant) injection 1 mg  1 mg Intramuscular PRN Dom Wang MD        hydrALAZINE tablet 50 mg  50 mg Oral Q8H PRN Ryne Hair MD   50 mg at 09/28/24 2349    LIDOcaine (PF) 10 mg/ml (1%) injection 10 mg  1 mL Intradermal On Call Procedure Ryne Hair MD        melatonin tablet 6 mg  6 mg Oral Nightly PRN Ryne Hair MD   6 mg at 09/30/24 2040    memantine tablet 5 mg  5 mg Oral Daily Ryne Hair MD   5 mg at 10/01/24 0814    methocarbamoL tablet 500 mg  500 mg Oral Q8H PRN Amie Lindsay MD        mupirocin 2 % ointment 1 g  1 g Nasal On Call Procedure Ryne Hair MD        mupirocin 2 % ointment 1 g  1 g Nasal BID Ryne Hair MD   1 g at 10/01/24 0900    mupirocin 2 % ointment   Nasal On Call Procedure MELANIE Arvizu MD   Given at 09/29/24 0653    naloxone 0.4 mg/mL injection 0.4 mg  0.4 mg Intravenous PRN Amie Lindsay MD        ondansetron injection 4 mg  4 mg Intravenous Q12H PRN Ryne Hair MD        potassium, sodium phosphates 280-160-250  mg packet 1 packet  1 packet Oral QID (AC & HS) Amie Lindsay MD   1 packet at 10/01/24 0813    ropivacaine 0.2% Perineural Pump infusion 500 ML   Perineural Continuous Savanna Ortiz MD   New Bag at 09/29/24 1034    sodium chloride 0.9% flush 10 mL  10 mL Intravenous PRN MELANIE Arvizu MD        sodium chloride 0.9% flush 10 mL  10 mL Intravenous PRN Ryne Hair MD        tranexamic acid (CYKLOKAPRON) 1,000 mg in 0.9% NaCl 100 mL IVPB (MB+)  1,000 mg Intravenous On Call Procedure MELANIE Arvizu MD        tranexamic acid (CYKLOKAPRON) 3,000 mg in 0.9% NaCl SolP 100 mL  3,000 mg Irrigation On Call Procedure Ryne Hair MD        vitamin D 1000 units tablet 1,000 Units  1,000 Units Oral Daily Amie Lindsay MD   1,000 Units at 10/01/24 0814        Anticoagulant dose Eliquis 2.5 BID    Assessment:    Toña Carrero is a 78 YO female with a PMH significant for advanced dementia and HTN s/p right femur intramedullary ayaz placement after a mechanical fall at [facility]. Today, patient is more alert than she was yesterday but still confused at baseline. She states her pain is well controlled and that she is doing well overall, no complaints. Plan to pause PNC and remove catheter tomorrow.      Pain control adequate    Plan:    Tylenol 1000mg Q8H  Robaxin 500mg Q8H PRN       Patient doing well, continue present treatment.    Thank you for involving us in her care. We will continue to follow. Please reach out to the Acute Pain Service with any additional questions or concerns.    Melissa Cerrato MD  Acute Pain Service, PGY-1  [facility]  10/01/2024.10:47 AM

## 2024-10-01 NOTE — ASSESSMENT & PLAN NOTE
Patient has Abnormal Phosphorus: hypophosphatemia. Will continue to monitor electrolytes closely. Will replace the affected electrolytes and repeat labs to be done after interventions completed. The patient's phosphorus results have been reviewed and are listed below.  Recent Labs   Lab 10/01/24  0211   PHOS 2.3*

## 2024-10-01 NOTE — ASSESSMENT & PLAN NOTE
-sustained in mechanical fall at the [facility] unit after just admitting there a day prior after being at  for placement previously.  -s/p IM nail with ortho today with wbat with PT/OT and plan for pt/ot at [facility] on discharge  -eliquis for 35 days with end date nov 4th  -multimodal pain meds and bowel regimen. PNC in place. Had bad reaction to either oxy vs scheduled 750 robaxni as both given 9/30 and narcan given in PM for oversedation with rapid improevment. Discussed with pain team who rec dec dose of robaxin and change to prn. Oxy had already been dc eralier in day. No pain today  -vit D mildly low at 27 and replacement started

## 2024-10-01 NOTE — ASSESSMENT & PLAN NOTE
Toña Carrero is a 79 y.o. female with Right IT fracture, closed, NVI. They take no anticoagulation at home. They required walker for ambulation prior to this injury.     -Admitted to medicine hip fracture service   -DVT PPx: Eliquis 2.5 BID  -Abx: post op ancef 24 hrs  -Labs: Hgb 9.8  -Pain: multimodal pain control  -Stevenson  -PT/OT - WBAT RLE  -Regular diet    »Dispo: PT recommends moderate intensity therapy at discharge

## 2024-10-01 NOTE — ASSESSMENT & PLAN NOTE
Hyponatremia is likely due to suspect intake but trend . The patient's most recent sodium results are listed below.  Recent Labs     09/29/24  0458 09/30/24  0523 10/01/24  0211   * 134* 135*       Plan  - Correct the sodium by 4-6mEq in 24 hours.   - Obtain the following studies: Urine sodium, urine osmolality, serum osmolality.  - Will treat the hyponatremia with IVF  - Monitor sodium Daily.   - Patient hyponatremia is  monitoring trend  -improving 129 to 134--135

## 2024-10-01 NOTE — PROGRESS NOTES
[facility] - Surgery  Orthopedics  Progress Note    Patient Name: Toña Carrero  MRN: [id number]  Admission Date: 9/28/2024  Hospital Length of Stay: 3 days  Attending Provider: Amie Lindsay MD  Primary Care Provider: Inez, Primary Doctor  Follow-up For: Procedure(s) (LRB):  INSERTION, INTRAMEDULLARY NORMA, FEMUR - right, earl, hana table (Right)    Post-Operative Day: 2 Days Post-Op  Subjective:     Principal Problem:Displaced intertrochanteric fracture of right femur, initial encounter for closed fracture    Principal Orthopedic Problem: as above; s/p IMN 9/29    Interval History: POD-2. Pt seen and examined at bedside. VSS, afebrile.  NAEON. Reported improved pain of right hip. Reports no new symptoms. Denies fevers, chills, N/V. Worked with PT yesterday; they recommend moderate intensity therapy at discharge.      Review of patient's allergies indicates:  No Known Allergies    Current Facility-Administered Medications   Medication    0.9%  NaCl infusion (for blood administration)    0.9%  NaCl infusion    acetaminophen tablet 1,000 mg    amLODIPine tablet 5 mg    apixaban tablet 2.5 mg    artificial tears 0.5 % ophthalmic solution 1 drop    bisacodyL suppository 10 mg    ciprofloxacin HCl 0.3 % ophthalmic solution 2 drop    dextrose 10% bolus 125 mL 125 mL    dextrose 10% bolus 250 mL 250 mL    EScitalopram oxalate tablet 10 mg    fentaNYL 50 mcg/mL injection 25 mcg    glucagon (human recombinant) injection 1 mg    hydrALAZINE tablet 50 mg    LIDOcaine (PF) 10 mg/ml (1%) injection 10 mg    melatonin tablet 6 mg    memantine tablet 5 mg    methocarbamoL tablet 500 mg    mupirocin 2 % ointment 1 g    mupirocin 2 % ointment 1 g    mupirocin 2 % ointment    naloxone 0.4 mg/mL injection 0.4 mg    ondansetron injection 4 mg    ropivacaine 0.2% Perineural Pump infusion 500 ML    sodium chloride 0.9% flush 10 mL    sodium chloride 0.9% flush 10 mL    tranexamic acid (CYKLOKAPRON) 1,000 mg in 0.9% NaCl 100 mL IVPB  "(MB+)    tranexamic acid (CYKLOKAPRON) 3,000 mg in 0.9% NaCl SolP 100 mL    vitamin D 1000 units tablet 1,000 Units     Objective:     Vital Signs (Most Recent):  Temp: 97.7 °F (36.5 °C) (10/01/24 0408)  Pulse: 75 (10/01/24 0408)  Resp: 16 (10/01/24 0408)  BP: (!) 148/65 (10/01/24 0408)  SpO2: (!) 94 % (10/01/24 0408) Vital Signs (24h Range):  Temp:  [97.3 °F (36.3 °C)-99.4 °F (37.4 °C)] 97.7 °F (36.5 °C)  Pulse:  [60-83] 75  Resp:  [16-20] 16  SpO2:  [93 %-96 %] 94 %  BP: (114-157)/(55-75) 148/65     Weight: 61.2 kg (134 lb 14.7 oz)  Height: 5' 3" (160 cm)  Body mass index is 23.9 kg/m².      Intake/Output Summary (Last 24 hours) at 10/1/2024 0533  Last data filed at 10/1/2024 0300  Gross per 24 hour   Intake --   Output 445 ml   Net -445 ml        Ortho/SPM Exam  NAD, resting comfortably in bed  AAOx3  No increased WOB  Extremities WWP    RLE    Dressing C/D/I   Appropriate post-op swelling, erythema.   Compartments soft/compressible  SILT throughout  Motor intact TA/EHL/Gastroc/FHL   DP pulse 2+. Brisk cap refill             Significant Labs: CBC:   Recent Labs   Lab 09/30/24  0524 10/01/24  0211   WBC 10.80 9.29   HGB 9.8* 8.4*   HCT 30.6* 26.9*    252     CMP:   Recent Labs   Lab 09/30/24  0523 10/01/24  0211   * 135*   K 4.0 3.6    110   CO2 21* 19*   GLU 96 89   BUN 19 19   CREATININE 0.9 0.7   CALCIUM 8.6* 8.3*   PROT 6.1 5.2*   ALBUMIN 3.0* 2.6*   BILITOT 0.4 0.3   ALKPHOS 110 92   AST 37 41*   ALT 15 9*   ANIONGAP 7* 6*     All pertinent labs within the past 24 hours have been reviewed.    Significant Imaging: I have reviewed and interpreted all pertinent imaging results/findings.  Assessment/Plan:     * Displaced intertrochanteric fracture of right femur, initial encounter for closed fracture  Toña Carrero is a 79 y.o. female with Right IT fracture, closed, NVI. They take no anticoagulation at home. They required walker for ambulation prior to this injury.     -Admitted to medicine " hip fracture service   -DVT PPx: Eliquis 2.5 BID  -Abx: post op ancef 24 hrs  -Labs: Hgb 9.8  -Pain: multimodal pain control  -Stevenson  -PT/OT - WBAT RLE  -Regular diet    »Dispo: PT recommends moderate intensity therapy at discharge          Kylie Mcqueen MD  Orthopedics  [facility] - Surgery

## 2024-10-01 NOTE — PT/OT/SLP PROGRESS
Physical Therapy Treatment    Patient Name:  Toña Carrero   MRN:  [id number]    Recommendations:     Discharge Recommendations: Moderate Intensity Therapy  Discharge Equipment Recommendations: wheelchair, walker, rolling, bedside commode  DME Justifications (see above for complete DME recommendations)     Bedside Commode- Patient has a mobility limitation that significantly impairs their ability to participate in one or more mobility related activities of daily living, including toileting. This deficit can be resolved by using a bedside commode. Patient demonstrates mobility limitations that will cause them to be confined to one room at home without bathroom access for up to 30 days. Using a bedside commode will greatly improve the patient's ability to participate in MRADLs.      Wheelchair-  Patient has a mobility limitation that significantly impairs their ability to participate in one or more mobility related activities of daily living in customary locations in the home. The mobility limitation cannot be sufficiently resolved by the use of a cane or walker. The use of a manual wheelchair will greatly improve the patient's ability to participate in MRADLs. The patient will use the wheelchair on a regular basis at home. They have expressed their willingness to use a manual wheelchair in the home, and have a caregiver who is available and willing to assist with the wheelchair if needed.   Barriers to discharge: None    Assessment:     Toña Carrero is a 79 y.o. female admitted with a medical diagnosis of Displaced intertrochanteric fracture of right femur, initial encounter for closed fracture.  She presents with the following impairments/functional limitations: weakness, impaired endurance, impaired functional mobility, gait instability, impaired balance, decreased lower extremity function, pain, decreased safety awareness, impaired cognition, decreased ROM, impaired skin, orthopedic precautions. Patient tolerated  PT session fairly today due to increased pain to R LE. She sat on edge of bed 10 minutes with contact guard assistance. She was unable to tolerate standing due to posterior lean and R LE pain when attempting to stand.     Rehab Prognosis: Good; patient would benefit from acute skilled PT services to address these deficits and reach maximum level of function.    Recent Surgery: Procedure(s) (LRB):  INSERTION, INTRAMEDULLARY NORMA, FEMUR - right, earl, hana table (Right) 2 Days Post-Op    Plan:     During this hospitalization, patient to be seen 4 x/week (hip pathway 10/2 then decrease to 4x's/week) to address the identified rehab impairments via gait training, therapeutic activities, therapeutic exercises, neuromuscular re-education and progress toward the following goals:    Plan of Care Expires:  10/30/24    Subjective     Chief Complaint: Right leg pain.   Patient/Family Comments/goals: Did not verbalize.   Pain/Comfort:  Pain Rating 1:  (did not rate)  Location - Side 1: Right  Location 1: hip  Pain Addressed 1: Reposition, Distraction, Nurse notified, Cessation of Activity      Objective:     Communicated with RN prior to session. Patient found supine with telemetry, felder catheter, PureWick, perineural catheter, peripheral IV, bed alarm, oxygen, FCD upon PT entry to room.     General Precautions: Standard, fall  Orthopedic Precautions: N/A  Braces: N/A  Respiratory Status: Nasal cannula, flow .5 L/min     Functional Mobility:  Bed Mobility:     Scooting: total assistance of 2 persons  Supine to Sit: total assistance of 2 persons  Sit to Supine: total assistance of 2 persons  Transfers:     Sit to Stand:  On attempt, patient pushing posteriorly due to not wanting to stand due to right leg pain.   Gait: unable to stand due to right leg pain.     AM-PAC 6 CLICK MOBILITY  Turning over in bed (including adjusting bedclothes, sheets and blankets)?: 2  Sitting down on and standing up from a chair with arms (e.g.,  wheelchair, bedside commode, etc.): 2  Moving from lying on back to sitting on the side of the bed?: 2  Moving to and from a bed to a chair (including a wheelchair)?: 2  Need to walk in hospital room?: 1  Climbing 3-5 steps with a railing?: 1  Basic Mobility Total Score: 10     Treatment & Education:  Patient sat on edge of bed 10 minutes with contact guard assistance.     Patient educated in:  -PT role and POC    Patient left supine with all lines intact, call button in reach, bed alarm on, and RN notified.    GOALS:   Multidisciplinary Problems       Physical Therapy Goals          Problem: Physical Therapy    Goal Priority Disciplines Outcome Interventions   Physical Therapy Goal     PT, PT/OT Progressing    Description: Goals to be met by: 10/30/2024     Patient will increase functional independence with mobility by performin. Supine to sit with Minimal Assistance  2. Sit to stand transfer with Minimal Assistance  3. Bed to chair transfer with Minimal Assistance using Rolling Walker  4. Gait x 20 feet with Minimal Assistance using Rolling Walker                          Time Tracking:     PT Received On: 10/01/24  PT Start Time: 922     PT Stop Time: 945  PT Total Time (min): 23 min     Billable Minutes: Neuromuscular Re-education 23    Co-treatment performed for this visit due to patient need for two skilled therapists to ensure patient and staff safety and to accommodate for patient activity tolerance/pain management.    Treatment Type: Treatment  PT/PTA: PT     Number of PTA visits since last PT visit: 0     10/01/2024

## 2024-10-01 NOTE — PT/OT/SLP PROGRESS
Occupational Therapy   Treatment    Name: Toña Carrero  MRN: [id number]  Admitting Diagnosis:  Displaced intertrochanteric fracture of right femur, initial encounter for closed fracture  2 Days Post-Op    Recommendations:     Discharge Recommendations: Moderate Intensity Therapy  Discharge Equipment Recommendations:  other (see comments) (to be determined by next level of care)  Barriers to discharge:       Assessment:     Toña Carrero is a 79 y.o. female with a medical diagnosis of Displaced intertrochanteric fracture of right femur, initial encounter for closed fracture.  She presents with the following performance deficits affecting function: weakness, impaired balance, impaired endurance, impaired cognition, impaired self care skills, decreased coordination, impaired functional mobility, decreased lower extremity function, gait instability, decreased upper extremity function, orthopedic precautions, impaired coordination, decreased ROM, pain, decreased safety awareness. Pt is very confused and resistive to movement d/t pain    Rehab Prognosis:  Good; patient would benefit from acute skilled OT services to address these deficits and reach maximum level of function.       Plan:     Patient to be seen daily (9/30, 10/1, 10/2 then 4 x/wk) to address the above listed problems via self-care/home management, therapeutic activities, therapeutic exercises  Plan of Care Expires: 10/30/24  Plan of Care Reviewed with: patient    Subjective     Chief Complaint: pain in R hip  Patient/Family Comments/goals: to improve function  Pain/Comfort:  Pain Rating 1:  (unrated)  Location - Side 1: Right  Location 1: hip  Pain Addressed 1: Pre-medicate for activity, Reposition, Distraction, Nurse notified  Pain Rating Post-Intervention 1:  (unrated)    Objective:     Communicated with: RN prior to session.  Patient found HOB elevated with telemetry, PureWick, perineural catheter, bed alarm, oxygen, peripheral IV upon OT entry to  room.  A client care conference was completed by the OTR and the DOWNEY prior to treatment by the DOWNEY to discuss the patient's POC and current status.    General Precautions: Standard, fall    Orthopedic Precautions:N/A  Braces: N/A  Respiratory Status: Nasal cannula, flow .5 L/min     Occupational Performance:     Bed Mobility:    Patient completed Scooting/Bridging with total assistance  Patient completed Supine to Sit with total assistance and 2 persons  Patient completed Sit to Supine with total assistance and 2 persons     Functional Mobility/Transfers:  Not performed d/t pt being resistive d/t pain, 1 attempt with pt leaning backwards to resist stand while yelling    Activities of Daily Living:  Grooming: contact guard assistance for facial care seated EOB      [facility] 6 Click ADL: 12    Treatment & Education:  Pt educated on OT POC and frequency during hospital stay.   Pt sat EOB for ~10min with CGA.  Pt educated on importance of OOB activity to improve function and activity tolerance.  Addressed all patient questions/concerns within DOWNEY scope of practice.     Patient left HOB elevated with all lines intact, call button in reach, and RN notified    GOALS:   Multidisciplinary Problems       Occupational Therapy Goals          Problem: Occupational Therapy    Goal Priority Disciplines Outcome Interventions   Occupational Therapy Goal     OT, PT/OT Progressing    Description: Goals to be met by: 10/30/24     Patient will increase functional independence with ADLs by performing:    UE Dressing with Modified [place].  LE Dressing with Modified [place].  Grooming while standing at sink with Modified [place].  Toileting from toilet with Modified [place] for hygiene and clothing management.   Bathing from  shower chair/bench with Modified [place].  Toilet transfer to toilet with Modified [place].  Pt will be AxOx x4.                           Time Tracking:     OT Date of Treatment:  10/01/24  OT Start Time: 0922  OT Stop Time: 0945  OT Total Time (min): 23 min    Billable Minutes:Therapeutic Activity 23    OT/DEVAUGHN: DEVAUGHN     Number of DEVAUGHN visits since last OT visit: 1    10/1/2024

## 2024-10-02 VITALS
RESPIRATION RATE: 18 BRPM | DIASTOLIC BLOOD PRESSURE: 70 MMHG | HEIGHT: 63 IN | SYSTOLIC BLOOD PRESSURE: 166 MMHG | WEIGHT: 134.94 LBS | TEMPERATURE: 98 F | BODY MASS INDEX: 23.91 KG/M2 | OXYGEN SATURATION: 95 % | HEART RATE: 67 BPM

## 2024-10-02 LAB
ALBUMIN SERPL BCP-MCNC: 2.8 G/DL (ref 3.5–5.2)
ALP SERPL-CCNC: 131 U/L (ref 55–135)
ALT SERPL W/O P-5'-P-CCNC: 16 U/L (ref 10–44)
ANION GAP SERPL CALC-SCNC: 8 MMOL/L (ref 8–16)
AST SERPL-CCNC: 45 U/L (ref 10–40)
BACTERIA UR CULT: NO GROWTH
BASOPHILS # BLD AUTO: 0.07 K/UL (ref 0–0.2)
BASOPHILS NFR BLD: 0.6 % (ref 0–1.9)
BILIRUB SERPL-MCNC: 0.6 MG/DL (ref 0.1–1)
BUN SERPL-MCNC: 11 MG/DL (ref 8–23)
CALCIUM SERPL-MCNC: 8.9 MG/DL (ref 8.7–10.5)
CHLORIDE SERPL-SCNC: 106 MMOL/L (ref 95–110)
CO2 SERPL-SCNC: 19 MMOL/L (ref 23–29)
CREAT SERPL-MCNC: 0.7 MG/DL (ref 0.5–1.4)
DIFFERENTIAL METHOD BLD: ABNORMAL
EOSINOPHIL # BLD AUTO: 0.2 K/UL (ref 0–0.5)
EOSINOPHIL NFR BLD: 1.7 % (ref 0–8)
ERYTHROCYTE [DISTWIDTH] IN BLOOD BY AUTOMATED COUNT: 15.1 % (ref 11.5–14.5)
EST. GFR  (NO RACE VARIABLE): >60 ML/MIN/1.73 M^2
GLUCOSE SERPL-MCNC: 99 MG/DL (ref 70–110)
HCT VFR BLD AUTO: 29.8 % (ref 37–48.5)
HGB BLD-MCNC: 10.1 G/DL (ref 12–16)
IMM GRANULOCYTES # BLD AUTO: 0.13 K/UL (ref 0–0.04)
IMM GRANULOCYTES NFR BLD AUTO: 1.1 % (ref 0–0.5)
LYMPHOCYTES # BLD AUTO: 2.1 K/UL (ref 1–4.8)
LYMPHOCYTES NFR BLD: 17.2 % (ref 18–48)
MAGNESIUM SERPL-MCNC: 1.5 MG/DL (ref 1.6–2.6)
MCH RBC QN AUTO: 28.9 PG (ref 27–31)
MCHC RBC AUTO-ENTMCNC: 33.9 G/DL (ref 32–36)
MCV RBC AUTO: 85 FL (ref 82–98)
MONOCYTES # BLD AUTO: 1 K/UL (ref 0.3–1)
MONOCYTES NFR BLD: 8.3 % (ref 4–15)
NEUTROPHILS # BLD AUTO: 8.5 K/UL (ref 1.8–7.7)
NEUTROPHILS NFR BLD: 71.1 % (ref 38–73)
NRBC BLD-RTO: 0 /100 WBC
PHOSPHATE SERPL-MCNC: 2.6 MG/DL (ref 2.7–4.5)
PLATELET # BLD AUTO: 314 K/UL (ref 150–450)
PMV BLD AUTO: 9.8 FL (ref 9.2–12.9)
POTASSIUM SERPL-SCNC: 3.7 MMOL/L (ref 3.5–5.1)
PROT SERPL-MCNC: 6.1 G/DL (ref 6–8.4)
RBC # BLD AUTO: 3.49 M/UL (ref 4–5.4)
SODIUM SERPL-SCNC: 133 MMOL/L (ref 136–145)
WBC # BLD AUTO: 11.98 K/UL (ref 3.9–12.7)

## 2024-10-02 PROCEDURE — 97116 GAIT TRAINING THERAPY: CPT

## 2024-10-02 PROCEDURE — 25000003 PHARM REV CODE 250: Performed by: HOSPITALIST

## 2024-10-02 PROCEDURE — 25000003 PHARM REV CODE 250

## 2024-10-02 PROCEDURE — 85025 COMPLETE CBC W/AUTO DIFF WBC: CPT | Performed by: HOSPITALIST

## 2024-10-02 PROCEDURE — 80053 COMPREHEN METABOLIC PANEL: CPT | Performed by: HOSPITALIST

## 2024-10-02 PROCEDURE — 97530 THERAPEUTIC ACTIVITIES: CPT

## 2024-10-02 PROCEDURE — 36415 COLL VENOUS BLD VENIPUNCTURE: CPT | Performed by: HOSPITALIST

## 2024-10-02 PROCEDURE — 99499 UNLISTED E&M SERVICE: CPT | Mod: ,,, | Performed by: STUDENT IN AN ORGANIZED HEALTH CARE EDUCATION/TRAINING PROGRAM

## 2024-10-02 PROCEDURE — 97535 SELF CARE MNGMENT TRAINING: CPT | Mod: CO

## 2024-10-02 PROCEDURE — 25000242 PHARM REV CODE 250 ALT 637 W/ HCPCS: Performed by: HOSPITALIST

## 2024-10-02 PROCEDURE — 94640 AIRWAY INHALATION TREATMENT: CPT

## 2024-10-02 PROCEDURE — 83735 ASSAY OF MAGNESIUM: CPT | Performed by: HOSPITALIST

## 2024-10-02 PROCEDURE — 84100 ASSAY OF PHOSPHORUS: CPT | Performed by: HOSPITALIST

## 2024-10-02 PROCEDURE — 94761 N-INVAS EAR/PLS OXIMETRY MLT: CPT

## 2024-10-02 PROCEDURE — 97530 THERAPEUTIC ACTIVITIES: CPT | Mod: CO

## 2024-10-02 RX ORDER — IPRATROPIUM BROMIDE AND ALBUTEROL SULFATE 2.5; .5 MG/3ML; MG/3ML
3 SOLUTION RESPIRATORY (INHALATION) EVERY 4 HOURS PRN
Start: 2024-10-02 | End: 2025-10-02

## 2024-10-02 RX ORDER — IPRATROPIUM BROMIDE AND ALBUTEROL SULFATE 2.5; .5 MG/3ML; MG/3ML
3 SOLUTION RESPIRATORY (INHALATION) EVERY 4 HOURS PRN
Status: DISCONTINUED | OUTPATIENT
Start: 2024-10-02 | End: 2024-10-02 | Stop reason: HOSPADM

## 2024-10-02 RX ORDER — HYDROCHLOROTHIAZIDE 12.5 MG/1
12.5 TABLET ORAL DAILY
Status: DISCONTINUED | OUTPATIENT
Start: 2024-10-02 | End: 2024-10-02 | Stop reason: HOSPADM

## 2024-10-02 RX ORDER — HYDROCHLOROTHIAZIDE 12.5 MG/1
12.5 TABLET ORAL DAILY
Start: 2024-10-02 | End: 2025-10-02

## 2024-10-02 RX ORDER — CHOLECALCIFEROL (VITAMIN D3) 25 MCG
1000 TABLET ORAL DAILY
Start: 2024-10-02

## 2024-10-02 RX ORDER — HYDROCHLOROTHIAZIDE 12.5 MG/1
12.5 TABLET ORAL DAILY
Qty: 30 TABLET | Refills: 11 | Status: SHIPPED | OUTPATIENT
Start: 2024-10-02 | End: 2024-10-02

## 2024-10-02 RX ORDER — AMLODIPINE BESYLATE 10 MG/1
10 TABLET ORAL DAILY
Start: 2024-10-02 | End: 2025-10-02

## 2024-10-02 RX ADMIN — HYDRALAZINE HYDROCHLORIDE 50 MG: 50 TABLET ORAL at 05:10

## 2024-10-02 RX ADMIN — POTASSIUM & SODIUM PHOSPHATES POWDER PACK 280-160-250 MG 1 PACKET: 280-160-250 PACK at 06:10

## 2024-10-02 RX ADMIN — ACETAMINOPHEN 1000 MG: 500 TABLET ORAL at 01:10

## 2024-10-02 RX ADMIN — AMLODIPINE BESYLATE 10 MG: 10 TABLET ORAL at 08:10

## 2024-10-02 RX ADMIN — CIPROFLOXACIN 2 DROP: 3 SOLUTION OPHTHALMIC at 05:10

## 2024-10-02 RX ADMIN — APIXABAN 2.5 MG: 2.5 TABLET, FILM COATED ORAL at 08:10

## 2024-10-02 RX ADMIN — METHOCARBAMOL 500 MG: 500 TABLET ORAL at 01:10

## 2024-10-02 RX ADMIN — ESCITALOPRAM OXALATE 10 MG: 10 TABLET ORAL at 08:10

## 2024-10-02 RX ADMIN — HYDROCHLOROTHIAZIDE 12.5 MG: 12.5 TABLET ORAL at 08:10

## 2024-10-02 RX ADMIN — CIPROFLOXACIN 2 DROP: 3 SOLUTION OPHTHALMIC at 08:10

## 2024-10-02 RX ADMIN — ACETAMINOPHEN 1000 MG: 500 TABLET ORAL at 05:10

## 2024-10-02 RX ADMIN — IPRATROPIUM BROMIDE AND ALBUTEROL SULFATE 3 ML: .5; 3 SOLUTION RESPIRATORY (INHALATION) at 03:10

## 2024-10-02 RX ADMIN — CIPROFLOXACIN 2 DROP: 3 SOLUTION OPHTHALMIC at 04:10

## 2024-10-02 RX ADMIN — CIPROFLOXACIN 2 DROP: 3 SOLUTION OPHTHALMIC at 12:10

## 2024-10-02 RX ADMIN — MEMANTINE HYDROCHLORIDE 5 MG: 5 TABLET ORAL at 08:10

## 2024-10-02 RX ADMIN — CIPROFLOXACIN 2 DROP: 3 SOLUTION OPHTHALMIC at 09:10

## 2024-10-02 RX ADMIN — CIPROFLOXACIN 2 DROP: 3 SOLUTION OPHTHALMIC at 01:10

## 2024-10-02 RX ADMIN — CHOLECALCIFEROL TAB 25 MCG (1000 UNIT) 1000 UNITS: 25 TAB at 08:10

## 2024-10-02 RX ADMIN — CIPROFLOXACIN 2 DROP: 3 SOLUTION OPHTHALMIC at 02:10

## 2024-10-02 RX ADMIN — POTASSIUM & SODIUM PHOSPHATES POWDER PACK 280-160-250 MG 1 PACKET: 280-160-250 PACK at 10:10

## 2024-10-02 RX ADMIN — MUPIROCIN 1 G: 20 OINTMENT TOPICAL at 08:10

## 2024-10-02 NOTE — PLAN OF CARE
Problem: Skin Injury Risk Increased  Goal: Skin Health and Integrity  Outcome: Progressing     Problem: Infection  Goal: Absence of Infection Signs and Symptoms  Outcome: Progressing     Problem: Adult Inpatient Plan of Care  Goal: Plan of Care Review  Outcome: Progressing  Goal: Patient-Specific Goal (Individualized)  Outcome: Progressing  Goal: Absence of Hospital-Acquired Illness or Injury  Outcome: Progressing  Goal: Optimal Comfort and Wellbeing  Outcome: Progressing    Pt is AO to self but disoriented to time and place. Pt needs constant reorientation. Pt is progressing towards plan of care goals. Pain management has been assessed. Fall precautions in place, no report of falls. Bed alarm on, locked and in lowest position.

## 2024-10-02 NOTE — ASSESSMENT & PLAN NOTE
Toña Carrero is a 79 y.o. female with Right IT fracture, closed, NVI. They take no anticoagulation at home. They required walker for ambulation prior to this injury.     -Admitted to medicine hip fracture service   -DVT PPx: Eliquis 2.5 BID  -Abx: post op ancef 24 hrs complete  -Pain: multimodal pain control  -PT/OT - WBAT RLE  -Regular diet    »Dispo: PT recommends moderate intensity therapy at discharge

## 2024-10-02 NOTE — CONSULTS
Inpatient consult to Physical Medicine Rehab  Consult performed by: Jerrica Mittal NP  Consult ordered by: Amie Lindsay MD      Consult received.     Jerrica Mittal NP  Physical Medicine & Rehabilitation   10/02/2024

## 2024-10-02 NOTE — ANESTHESIA POST-OP PAIN MANAGEMENT
Acute Pain Service Progress Note    Toña Carrero is a 79 y.o., female, [id number].    Surgery:  Right femur IM ayaz    Post Op Day #: 3    Catheter type: perineural  Right SIFI PNC    Infusion type: Ropivacaine 0.2%  15mL/3hr IB. No demand bolus    Problem List:    Active Hospital Problems    Diagnosis  POA    *Displaced intertrochanteric fracture of right femur, initial encounter for closed fracture [S72.141A]  Yes    Hypophosphatemia [E83.39]  No    Acute blood loss anemia [D62]  No    Itchy eyes [H57.9]  Yes    Hyponatremia [E87.1]  Yes    History of carotid stenosis [Z86.79]  Not Applicable     33% right 50% left      Chronic diastolic heart failure [I50.32]  Yes    Mild mitral regurgitation [I34.0]  Yes    Mild tricuspid regurgitation [I07.1]  Yes    Vitamin D insufficiency [E55.9]  Yes    Advanced dementia [F03.C0]  Yes    HTN (hypertension) [I10]  Yes      Resolved Hospital Problems   No resolved problems to display.       Subjective:     General appearance of alert, oriented, no complaints   Pain with rest: 1    Numbers   Pain with movement: 2    Numbers   Side Effects    1. Pruritis No    2. Nausea No    3. Motor Blockade No, 1=Ability to bend knees and ankles    4. Sedation No, 1=awake and alert    Objective:     Catheter site clean, dry, intact      Vitals   Vitals:    10/02/24 0740   BP: (!) 166/82   Pulse: 88   Resp: 15   Temp: 37.1 °C (98.7 °F)        Labs    No results displayed because visit has over 200 results.           Meds   Current Facility-Administered Medications   Medication Dose Route Frequency Provider Last Rate Last Admin    0.9%  NaCl infusion (for blood administration)   Intravenous Q24H PRN MELANIE Arvizu MD        acetaminophen tablet 1,000 mg  1,000 mg Oral Q8H Melissa Cerrato MD   1,000 mg at 10/02/24 0503    amLODIPine tablet 10 mg  10 mg Oral Daily Amie Lindsay MD        apixaban tablet 2.5 mg  2.5 mg Oral BID MELANIE Arvizu MD   2.5 mg at 10/01/24 2050    artificial  tears 0.5 % ophthalmic solution 1 drop  1 drop Both Eyes QID PRN Amie Lindsay MD   1 drop at 10/01/24 0814    bisacodyL suppository 10 mg  10 mg Rectal Daily PRN Ryne Hair MD        ciprofloxacin HCl 0.3 % ophthalmic solution 2 drop  2 drop Right Eye Q2H Dhaval Merchant MD   2 drop at 10/02/24 0503    dextrose 10% bolus 125 mL 125 mL  12.5 g Intravenous PRN Dom Wang MD        dextrose 10% bolus 250 mL 250 mL  25 g Intravenous PRN Dom Wang MD        EScitalopram oxalate tablet 10 mg  10 mg Oral Daily Ryne Hair MD   10 mg at 10/01/24 0814    fentaNYL 50 mcg/mL injection 25 mcg  25 mcg Intravenous Q5 Min PRN Ryne Hair MD        glucagon (human recombinant) injection 1 mg  1 mg Intramuscular PRN Dom Wang MD        hydrALAZINE tablet 50 mg  50 mg Oral Q8H PRN Ryne Hair MD   50 mg at 10/02/24 0503    hydroCHLOROthiazide tablet 12.5 mg  12.5 mg Oral Daily Amie Lindsay MD        LIDOcaine (PF) 10 mg/ml (1%) injection 10 mg  1 mL Intradermal On Call Procedure Ryne Hair MD        melatonin tablet 6 mg  6 mg Oral Nightly PRN Ryne Hair MD   6 mg at 10/01/24 2048    memantine tablet 5 mg  5 mg Oral Daily Ryne Hair MD   5 mg at 10/01/24 0814    methocarbamoL tablet 500 mg  500 mg Oral Q8H PRN Amie Lindsay MD   500 mg at 10/02/24 0133    mupirocin 2 % ointment 1 g  1 g Nasal On Call Procedure Ryne Hair MD        mupirocin 2 % ointment 1 g  1 g Nasal BID Ryne Hair MD   1 g at 10/01/24 2050    mupirocin 2 % ointment   Nasal On Call Procedure MELANIE Arvizu MD   Given at 09/29/24 0653    naloxone 0.4 mg/mL injection 0.4 mg  0.4 mg Intravenous PRN Amie Lindsay MD        ondansetron injection 4 mg  4 mg Intravenous Q12H PRN Ryne Hair MD        potassium, sodium phosphates 280-160-250 mg packet 1 packet  1 packet Oral QID (AC & HS) Amie Lindsay MD   1 packet at 10/02/24 0613    ropivacaine 0.2%  Perineural Pump infusion 500 ML   Perineural Continuous Savanna Ortiz MD   New Bag at 09/29/24 1034    sodium chloride 0.9% flush 10 mL  10 mL Intravenous PRN MELANIE Arvizu MD        sodium chloride 0.9% flush 10 mL  10 mL Intravenous PRN Ryne Hair MD        tranexamic acid (CYKLOKAPRON) 1,000 mg in 0.9% NaCl 100 mL IVPB (MB+)  1,000 mg Intravenous On Call Procedure MELANIE Arvizu MD        tranexamic acid (CYKLOKAPRON) 3,000 mg in 0.9% NaCl SolP 100 mL  3,000 mg Irrigation On Call Procedure Ryne Hair MD        vitamin D 1000 units tablet 1,000 Units  1,000 Units Oral Daily Amie Lindsay MD   1,000 Units at 10/01/24 0814        Anticoagulant dose Eliquis 2.5 BID    Assessment:    Toña Carrero is a 80 YO female with a PMH significant for advanced dementia and HTN s/p right femur intramedullary ayaz placement after a mechanical fall at [facility]. Today, patient states her pain is well controlled and that she is doing well overall, no complaints. Paused the PNC and plan to remove the catheter this afternoon.      Pain control adequate    Plan:    Tylenol 1000mg Q8H  Robaxin 500mg Q8H PRN       Patient doing well, continue present treatment.    Thank you for involving us in her care. We will sign off. Please reach out to the Acute Pain Service with any additional questions or concerns.    Melissa Cerrato MD  Acute Pain Service, PGY-1  [facility]  10/02/2024.8:19 AM

## 2024-10-02 NOTE — ADDENDUM NOTE
Addendum  created 10/02/24 1223 by Mannie Connolly MD    Charge Capture section accepted, Cosign clinical note with attestation, Intraprocedure Event edited

## 2024-10-02 NOTE — PLAN OF CARE
[facility]    FACILITY TRANSFER ORDERS      Patient Name: Toña Carrero  YOB: 1945    PCP: Inez, Primary Doctor   PCP Address: None  PCP Phone Number: None  PCP Fax: None    Encounter Date: 10/02/2024    Admit to: inpatient rehab    Vital Signs:  Routine    Diagnoses:   Active Hospital Problems    Diagnosis  POA    *Displaced intertrochanteric fracture of right femur, initial encounter for closed fracture [S72.141A]  Yes    Hypophosphatemia [E83.39]  No    Acute blood loss anemia [D62]  No    Itchy eyes [H57.9]  Yes    Hyponatremia [E87.1]  Yes    History of carotid stenosis [Z86.79]  Not Applicable     33% right 50% left      Chronic diastolic heart failure [I50.32]  Yes    Mild mitral regurgitation [I34.0]  Yes    Mild tricuspid regurgitation [I07.1]  Yes    Vitamin D insufficiency [E55.9]  Yes    Advanced dementia [F03.C0]  Yes    HTN (hypertension) [I10]  Yes      Resolved Hospital Problems   No resolved problems to display.       Allergies:Review of patient's allergies indicates:  No Known Allergies    Diet: regular diet    Activities: wbat RLE    Goals of Care Treatment Preferences:  Code Status: Full Code        CONSULTS:    Physical Therapy to evaluate and treat.  and Occupational Therapy to evaluate and treat.    MISCELLANEOUS CARE:  Routine Skin for Bedridden Patients: Apply moisture barrier cream to all skin folds and wet areas in perineal area daily and after baths and all bowel movements.    WOUND CARE ORDERS    Keep bandages in place to RLE until orthopedics follow up. Do not immerse in water. Reinforce PRN     Medications: Review discharge medications with patient and family and provide education.         Medication List        START taking these medications      acetaminophen 500 MG tablet  Commonly known as: TYLENOL  Take 2 tablets (1,000 mg total) by mouth every 8 (eight) hours.     albuterol-ipratropium 2.5 mg-0.5 mg/3 mL nebulizer solution  Commonly known as:  DUO-NEB  Take 3 mLs by nebulization every 4 (four) hours as needed for Wheezing. Rescue     apixaban 2.5 mg Tab  Commonly known as: ELIQUIS  Take 1 tablet (2.5 mg total) by mouth 2 (two) times daily. End date 11/4/24     artificial tears 0.5 % ophthalmic solution  Commonly known as: ISOPTO TEARS  Place 1 drop into both eyes 4 (four) times daily as needed.     hydroCHLOROthiazide 12.5 MG Tab  Commonly known as: HYDRODIURIL  Take 1 tablet (12.5 mg total) by mouth once daily.     vitamin D 1000 units Tab  Commonly known as: VITAMIN D3  Take 1 tablet (1,000 Units total) by mouth once daily.            CHANGE how you take these medications      amLODIPine 10 MG tablet  Commonly known as: NORVASC  Take 1 tablet (10 mg total) by mouth once daily.  What changed:   medication strength  how much to take  when to take this            CONTINUE taking these medications      EScitalopram oxalate 10 MG tablet  Commonly known as: LEXAPRO  Take 1 tablet by mouth once daily.     memantine 5 MG Tab  Commonly known as: NAMENDA  Take 1 tablet by mouth once daily.            STOP taking these medications      aspirin 81 MG Chew                Immunizations Administered as of 10/2/2024       Name Date Dose VIS Date Route Exp Date    COVID-19, MRNA, LN-S, PF (Pfizer) (Purple Cap) 10/14/2021 0.3 mL -- -- --    Lot: [id number]     COVID-19, MRNA, LN-S, PF (Pfizer) (Purple Cap) 10/14/2021 0.3 mL -- Intramuscular --    Site: Right deltoid     : [employer]     Lot: [id number]     Comment: Adminis     COVID-19, MRNA, LN-S, PF (Pfizer) (Purple Cap) 2/15/2021 0.3 mL -- -- --    Lot: [id number]     COVID-19, MRNA, LN-S, PF (Pfizer) (Purple Cap) 2/15/2021 0.3 mL -- Intramuscular --    Site: Left deltoid     : [employer]     Lot: [id number]     Comment: Adminis     COVID-19, MRNA, LN-S, PF (Pfizer) (Purple Cap) 1/25/2021 0.3 mL -- -- --    Lot: [id number]     COVID-19, MRNA, LN-S, PF (Pfizer) (Purple Cap) 1/25/2021 0.3 mL -- Intramuscular --     Site: Left deltoid     : [employer]     Lot: [id number]     Comment: Adminis             This patient has had both covid vaccinations    Some patients may experience side effects after vaccination.  These may include fever, headache, muscle or joint aches.  Most symptoms resolve with 24-48 hours and do not require urgent medical evaluation unless they persist for more than 72 hours or symptoms are concerning for an unrelated medical condition.          _________________________________  Amie Lindsay MD  10/02/2024

## 2024-10-02 NOTE — ASSESSMENT & PLAN NOTE
-Pt. Not taking medications prior to recent admission, had previously been prescribed amlodipine, coreg, hctz in 2022. Restart previously prescribed med amlodipine at 5 mg. Monitor and titrate meds as needed and imrpoving now, with prn hydralazine as in ER was 180s-200 systolic\  BP higher 10/1 so inc norvasc to 10  Hctz added as was prev home med in past for higher BPS 10/2

## 2024-10-02 NOTE — ASSESSMENT & PLAN NOTE
-sustained in mechanical fall at the [facility] care unit after just admitting there a day prior after being at  for placement previously.  -s/p IM nail with ortho today with wbat with PT/OT and plan for pt/ot at [facility] on discharge  -eliquis for 35 days with end date nov 4th  -multimodal pain meds and bowel regimen. PNC in place. Had bad reaction to either oxy vs scheduled 750 robaxni as both given 9/30 and narcan given in PM for oversedation with rapid improevment. Discussed with pain team who rec dec dose of robaxin and change to prn. Oxy had already been dc eralier in day. No pain Tuesday and not given any PRNS so will keep off the PRNS on discharge as not requiring and unclear if reaction was too robaxin or oxy from earlier that day  -plan for placement before going to assisted living now per 10/2, improving moblity but not independent enough yet for facility, pm and r consulted for possible rehab  -vit D mildly low at 27 and replacement started

## 2024-10-02 NOTE — PLAN OF CARE
[facility] ([phone]) stated that she needed to see pt  order to accept to her facility. Arranged for her to be with patient during today's therapy session. Patient not only stood, but walked with therapy.    Spoke with Ingrid Weeks at hospital regarding facility transfer/admission, she phoned [facility] who states they cannot admit pt, pt needs to discharge to SNF for 2 weeks.    Phoned pt daughter Dyana Obando [phone] who states that after conversation with [facility] she feels her mother deserves SNF placement for 2 weeks. Discussed accepting SNF facility [facility] and she asked if referrals could be sent to first choice 1. [facility] and  2. [facility].    Referrals sent, pending accepting facility    Left voicemail for return call from [facility] admissions office [phone]          Based on patient work with therapy today, therapist recommendation has changed to reflect her progress. Patient Dyana in agreement with Inpatient Rehab placement with her first choice being [facility].    [facility] has accepted patient and will let me know when transportation can be set up.  Notified Ms. Turpin of patient acceptance, she verbalized her excitement and appreciation of [facility] placement.

## 2024-10-02 NOTE — PLAN OF CARE
10/02/24 1430   Post-Acute Status   Post-Acute Authorization Placement   Post-Acute Placement Status Set-up Complete/Auth obtained   Discharge Plan   Discharge Plan A Rehab     Patient's set-up has been completed.RAY scheduled d/c transportation to [facility] through [facility]. Patient is scheduled to be picked up at 3:30 PM. RAY provided patient's nurse with report number #[phone]; ask for the nurse for the patient. Requested  time does not guarantee arrival time.        Josee Simmons LMSW  Case Management   [facility]   Ext. [phone]

## 2024-10-02 NOTE — PLAN OF CARE
Skilled nursing facility ORDERS    10/02/2024  [facility]  [address] [address]  Dept: [phone]  Loc: [phone]     Admit to skilled nursing facility:      Diagnoses:  Active Hospital Problems    Diagnosis  POA    *Displaced intertrochanteric fracture of right femur, initial encounter for closed fracture [S72.141A]  Yes    Hypophosphatemia [E83.39]  No    Acute blood loss anemia [D62]  No    Itchy eyes [H57.9]  Yes    Hyponatremia [E87.1]  Yes    History of carotid stenosis [Z86.79]  Not Applicable     33% right 50% left      Chronic diastolic heart failure [I50.32]  Yes    Mild mitral regurgitation [I34.0]  Yes    Mild tricuspid regurgitation [I07.1]  Yes    Vitamin D insufficiency [E55.9]  Yes    Advanced dementia [F03.C0]  Yes    HTN (hypertension) [I10]  Yes      Resolved Hospital Problems   No resolved problems to display.       Patient is homebound due to:  Displaced intertrochanteric fracture of right femur, initial encounter for closed fracture    Allergies:Review of patient's allergies indicates:  No Known Allergies    Vitals:  Every shift    Diet: regular diet    Activities:   WBAT to RLE    Goals of Care Treatment Preferences:  Code Status: Full Code          Nursing Precautions:  Fall and Pressure ulcer prevention + delirium precautions    Consults:   PT to evaluate and treat- 3 times a week, OT to evaluate and treat- 3 times a week, and ST to evaluate and treat- 3 times a week     Miscellaneous Care: Routine Skin for Bedridden Patients:  Apply moisture barrier cream to all    Keep bandages in place to RLE until orthopedics follow up. Do not immerse in water. Reinforce PRN                    Medications: Discontinue all previous medication orders, if any. See new list below.     Medication List        START taking these medications      acetaminophen 500 MG tablet  Commonly known as: TYLENOL  Take 2 tablets (1,000 mg total) by mouth  every 8 (eight) hours.     apixaban 2.5 mg Tab  Commonly known as: ELIQUIS  Take 1 tablet (2.5 mg total) by mouth 2 (two) times daily. End date 11/4/24     artificial tears 0.5 % ophthalmic solution  Commonly known as: ISOPTO TEARS  Place 1 drop into both eyes 4 (four) times daily as needed.     hydroCHLOROthiazide 12.5 MG Tab  Commonly known as: HYDRODIURIL  Take 1 tablet (12.5 mg total) by mouth once daily.     vitamin D 1000 units Tab  Commonly known as: VITAMIN D3  Take 1 tablet (1,000 Units total) by mouth once daily.            CHANGE how you take these medications      amLODIPine 10 MG tablet  Commonly known as: NORVASC  Take 1 tablet (10 mg total) by mouth once daily.  What changed:   medication strength  how much to take  when to take this            CONTINUE taking these medications      EScitalopram oxalate 10 MG tablet  Commonly known as: LEXAPRO  Take 1 tablet by mouth once daily.     memantine 5 MG Tab  Commonly known as: NAMENDA  Take 1 tablet by mouth once daily.            STOP taking these medications      aspirin 81 MG Chew                Immunizations Administered as of 10/2/2024       Name Date Dose VIS Date Route Exp Date    COVID-19, MRNA, LN-S, PF (Pfizer) (Purple Cap) 10/14/2021 0.3 mL -- -- --    Lot: [id number]     COVID-19, MRNA, LN-S, PF (Pfizer) (Purple Cap) 10/14/2021 0.3 mL -- Intramuscular --    Site: Right deltoid     : [employer]     Lot: [id number]     Comment: Adminis     COVID-19, MRNA, LN-S, PF (Pfizer) (Purple Cap) 2/15/2021 0.3 mL -- -- --    Lot: [id number]     COVID-19, MRNA, LN-S, PF (Pfizer) (Purple Cap) 2/15/2021 0.3 mL -- Intramuscular --    Site: Left deltoid     : [employer]     Lot: [id number]     Comment: Adminis     COVID-19, MRNA, LN-S, PF (Pfizer) (Purple Cap) 1/25/2021 0.3 mL -- -- --    Lot: [id number]     COVID-19, MRNA, LN-S, PF (Pfizer) (Purple Cap) 1/25/2021 0.3 mL -- Intramuscular --    Site: Left deltoid     : [employer]     Lot: [id number]      Comment: Adminis             This patient has had both covid vaccinations    Some patients may experience side effects after vaccination.  These may include fever, headache, muscle or joint aches.  Most symptoms resolve with 24-48 hours and do not require urgent medical evaluation unless they persist for more than 72 hours or symptoms are concerning for an unrelated medical condition.          _________________________________  Amie Lindsay MD  10/02/2024

## 2024-10-02 NOTE — PT/OT/SLP PROGRESS
Occupational Therapy   Co-Treatment with PT  Co-treatment performed due to patient's multiple deficits requiring two skilled therapists to appropriately and safely assess patient's strength and endurance while facilitating functional tasks in addition to accommodating for patient's activity tolerance.     Name: Toña Carrero  MRN: [id number]  Admitting Diagnosis:  Displaced intertrochanteric fracture of right femur, initial encounter for closed fracture  3 Days Post-Op    Recommendations:     Discharge Recommendations: Moderate Intensity Therapy  Discharge Equipment Recommendations:  other (see comments) (to be determined by next level of care)  Barriers to discharge:       Assessment:     Toña Carrero is a 79 y.o. female with a medical diagnosis of Displaced intertrochanteric fracture of right femur, initial encounter for closed fracture.  She presents with the following performance deficits affecting function: weakness, impaired balance, impaired endurance, impaired cognition, decreased coordination, impaired self care skills, impaired functional mobility, decreased lower extremity function, gait instability, orthopedic precautions, impaired cardiopulmonary response to activity, decreased safety awareness, pain.     Rehab Prognosis:  Good; patient would benefit from acute skilled OT services to address these deficits and reach maximum level of function.       Plan:     Patient to be seen daily (9/30, 10/1, 10/2 then 4 x/wk) to address the above listed problems via self-care/home management, therapeutic exercises, therapeutic activities  Plan of Care Expires: 10/30/24  Plan of Care Reviewed with: patient    Subjective     Patient/Family Comments/goals: to improve function  Pain/Comfort:  Pain Rating 1:  (unrated)  Location - Side 1: Right  Location 1: hip  Pain Addressed 1: Reposition, Distraction, Nurse notified  Pain Rating Post-Intervention 1:  (unrated)    Objective:     Communicated with: RN prior to session.   Patient found HOB elevated with telemetry, felder catheter, perineural catheter, bed alarm, peripheral IV, oxygen, FCD upon OT entry to room.  A client care conference was completed by the OTR and the DOWNEY prior to treatment by the DOWNEY to discuss the patient's POC and current status.    General Precautions: Standard, fall    Orthopedic Precautions:N/A  Braces: N/A  Respiratory Status: Nasal cannula, flow .5 L/min     Occupational Performance:     Bed Mobility:    Patient completed Scooting/Bridging with maximal assistance  Patient completed Supine to Sit with maximal assistance  Patient completed Sit to Supine with maximal assistance     Functional Mobility/Transfers:  Patient completed Sit <> Stand Transfer with maximal assistance  with  no assistive device and rolling walker   Patient completed Bed <> Chair Transfer using Stand Pivot technique with maximal assistance with no assistive device  Patient completed Toilet Transfer Stand Pivot technique with maximal assistance with  no AD  Functional Mobility: pt ambulating ~8ft and 10ft with Max A using RW.     Activities of Daily Living:  Grooming: contact guard assistance seated for facial care  Lower Body Dressing: total assistance to don and doff socks/underwear  Toileting: total assistance for clothing mgmt and pericare on [facility] 6 Click ADL: 12    Treatment & Education:  Pt educated on OT POC and frequency during hospital stay.   Pt educated on proper hand placement and techniques for RW mgmt to improve safety awareness.   Pt educated on importance of OOB activity to improve function and activity tolerance.  Addressed all patient questions/concerns within DOWNEY scope of practice.     Patient left HOB elevated with all lines intact, call button in reach, bed alarm on, and RN notified    GOALS:   Multidisciplinary Problems       Occupational Therapy Goals          Problem: Occupational Therapy    Goal Priority Disciplines Outcome Interventions    Occupational Therapy Goal     OT, PT/OT Progressing    Description: Goals to be met by: 10/30/24     Patient will increase functional independence with ADLs by performing:    UE Dressing with Modified [place].  LE Dressing with Modified [place].  Grooming while standing at sink with Modified [place].  Toileting from toilet with Modified [place] for hygiene and clothing management.   Bathing from  shower chair/bench with Modified [place].  Toilet transfer to toilet with Modified [place].  Pt will be AxOx x4.                           Time Tracking:     OT Date of Treatment: 10/02/24  OT Start Time: 0919  OT Stop Time: 1005  OT Total Time (min): 46 min    Billable Minutes:Self Care/Home Management 25  Therapeutic Activity 21    OT/DEVAUGHN: DEVAUGHN     Number of DEVAUGHN visits since last OT visit: 2    10/2/2024

## 2024-10-02 NOTE — SUBJECTIVE & OBJECTIVE
Principal Problem:Displaced intertrochanteric fracture of right femur, initial encounter for closed fracture    Principal Orthopedic Problem: as above; s/p IMN 9/29    Interval History: POD-3. Pt seen and examined at bedside. HDS, afebrile.  NAEON. Reported improved pain of right hip. Reports no new symptoms. Denies fevers, chills, N/V. Worked with PT yesterday, unable to stand and walk due to pain at the time; PT recommends moderate intensity therapy at discharge.      Review of patient's allergies indicates:  No Known Allergies    Current Facility-Administered Medications   Medication    0.9%  NaCl infusion (for blood administration)    acetaminophen tablet 1,000 mg    amLODIPine tablet 10 mg    apixaban tablet 2.5 mg    artificial tears 0.5 % ophthalmic solution 1 drop    bisacodyL suppository 10 mg    ciprofloxacin HCl 0.3 % ophthalmic solution 2 drop    dextrose 10% bolus 125 mL 125 mL    dextrose 10% bolus 250 mL 250 mL    EScitalopram oxalate tablet 10 mg    fentaNYL 50 mcg/mL injection 25 mcg    glucagon (human recombinant) injection 1 mg    hydrALAZINE tablet 50 mg    LIDOcaine (PF) 10 mg/ml (1%) injection 10 mg    melatonin tablet 6 mg    memantine tablet 5 mg    methocarbamoL tablet 500 mg    mupirocin 2 % ointment 1 g    mupirocin 2 % ointment 1 g    mupirocin 2 % ointment    naloxone 0.4 mg/mL injection 0.4 mg    ondansetron injection 4 mg    potassium, sodium phosphates 280-160-250 mg packet 1 packet    ropivacaine 0.2% Perineural Pump infusion 500 ML    sodium chloride 0.9% flush 10 mL    sodium chloride 0.9% flush 10 mL    tranexamic acid (CYKLOKAPRON) 1,000 mg in 0.9% NaCl 100 mL IVPB (MB+)    tranexamic acid (CYKLOKAPRON) 3,000 mg in 0.9% NaCl SolP 100 mL    vitamin D 1000 units tablet 1,000 Units     Objective:     Vital Signs (Most Recent):  Temp: 97.4 °F (36.3 °C) (10/02/24 0421)  Pulse: 72 (10/02/24 0421)  Resp: 18 (10/02/24 0421)  BP: (!) 184/83 (10/02/24 0421)  SpO2: (!) 92 % (10/02/24 0421)  "Vital Signs (24h Range):  Temp:  [97.4 °F (36.3 °C)-98.5 °F (36.9 °C)] 97.4 °F (36.3 °C)  Pulse:  [66-83] 72  Resp:  [16-20] 18  SpO2:  [92 %-96 %] 92 %  BP: (146-190)/(69-85) 184/83     Weight: 61.2 kg (134 lb 14.7 oz)  Height: 5' 3" (160 cm)  Body mass index is 23.9 kg/m².    No intake or output data in the 24 hours ending 10/02/24 0559       Ortho/SPM Exam  NAD, resting comfortably in bed  AAOx3  No increased WOB  Extremities WWP    RLE    Dressing C/D/I   Appropriate post-op swelling, erythema.   Compartments soft/compressible  SILT throughout  Motor intact TA/EHL/Gastroc/FHL   DP pulse 2+. Brisk cap refill             Significant Labs: CBC:   Recent Labs   Lab 10/01/24  0211   WBC 9.29   HGB 8.4*   HCT 26.9*        CMP:   Recent Labs   Lab 10/01/24  0211   *   K 3.6      CO2 19*   GLU 89   BUN 19   CREATININE 0.7   CALCIUM 8.3*   PROT 5.2*   ALBUMIN 2.6*   BILITOT 0.3   ALKPHOS 92   AST 41*   ALT 9*   ANIONGAP 6*     All pertinent labs within the past 24 hours have been reviewed.    Significant Imaging: I have reviewed and interpreted all pertinent imaging results/findings.  "

## 2024-10-02 NOTE — DISCHARGE SUMMARY
DISCHARGE SUMMARY  Hospital Medicine    Team: [facility]    Patient Name: Toña Carrero  YOB: 1945    Admit Date: 9/28/2024    Discharge Date: 10/02/2024    Discharge Attending Physician: Amie Lindsay MD     Principal Diagnoses:  Active Hospital Problems    Diagnosis  POA    *Displaced intertrochanteric fracture of right femur, initial encounter for closed fracture [S72.141A]  Yes    Hypophosphatemia [E83.39]  No    Acute blood loss anemia [D62]  No    Itchy eyes [H57.9]  Yes    Hyponatremia [E87.1]  Yes    History of carotid stenosis [Z86.79]  Not Applicable     33% right 50% left      Chronic diastolic heart failure [I50.32]  Yes    Mild mitral regurgitation [I34.0]  Yes    Mild tricuspid regurgitation [I07.1]  Yes    Vitamin D insufficiency [E55.9]  Yes    Advanced dementia [F03.C0]  Yes    HTN (hypertension) [I10]  Yes      Resolved Hospital Problems   No resolved problems to display.       Discharged Condition:  improved    HOSPITAL COURSE:      Initial Presentation:  80 yo F with PMHx advanced dementia and HTN who presents to the ED from the [facility] after a fall today with noted R hip deformity. Pt. Was recently admitted to [facility] 9/18-9/27 for worsening confusion and debility which was ultimately determined to be related to progressive dementia. Pt.  reportedly also needed placement as they both have advanced dementia and unable to care for themselves. She was discharged yesterday and placed at the [facility] assisted living facility. Today, paient reportedly had a fall in the dining room at the facility. She had noted deformity, EMS was contacted and reportedly wrapped pt. Pelvis in a sheet because of stability concerns. In ED, X-ray obtained revealed an impacted intertrochanteric fracture of the right femur.       Course of Principle Problem for Admission:    Displaced intertrochanteric fracture of right femur, initial encounter for closed  fracture  -sustained in mechanical fall at the [facility] unit after just admitting there a day prior after being at  for placement previously.  -s/p IM nail with ortho today with wbat with PT/OT and plan for pt/ot at [facility] on discharge  -eliquis for 35 days with end date nov 4th  -multimodal pain meds and bowel regimen. PNC in place. Had bad reaction to either oxy vs scheduled 750 robaxni as both given 9/30 and narcan given in PM for oversedation with rapid improevment. Discussed with pain team who rec dec dose of robaxin and change to prn. Oxy had already been dc eralier in day. No pain Tuesday and not given any PRNS so will keep off the PRNS on discharge as not requiring and unclear if reaction was too robaxin or oxy from earlier that day  -plan for placement before going to assisted living now per 10/2, improving moblity but not independent enough yet for facility, pm and r consulted for possible rehab and accepted today for this  -vit D mildly low at 27 and replacement started           Hypophosphatemia  Patient has Abnormal Phosphorus: hypophosphatemia. Will continue to monitor electrolytes closely. Will replace the affected electrolytes and repeat labs to be done after interventions completed. The patient's phosphorus results have been reviewed and are listed below.      Recent Labs   Lab 10/01/24  0211   PHOS 2.3*         Itchy eyes  Refractory to eye drops, anesthesia came to see with fluorosceien ons unday and starred cipro eye drops to right eye so possible corneal irritation from surgery, not fully documented in notes but will continue. Denies itching/burning 9/30 and compeltes cipro priro to dc to rehab        Acute blood loss anemia  Anemia is likely due to acute blood loss which was from surgery . Most recent hemoglobin and hematocrit are listed below.        Recent Labs     09/28/24  1610 09/29/24  0458 09/30/24  0524   HGB 12.4 11.7* 9.8*   HCT 38.0 36.8*  30.6*      Plan  - Monitor serial CBC: Daily  - Transfuse PRBC if patient becomes hemodynamically unstable, symptomatic or H/H drops below 7/21.  - Patient has not received any PRBC transfusions to date  - Patient's anemia is currently stable  -     Vitamin D insufficiency  Mildly low at 27 and replacement started        Mild tricuspid regurgitation  Echocardiogram with evidence of tricuspid regurgitation that is mild . The patient's most recent echocardiogram result is listed below. We will manage the valvular abnormality by seen in 2022 at echo at osh     No echocardiogram results found for the past 12 months      Mild mitral regurgitation  Echocardiogram with evidence of mitral regurgitation that is mild . The patient's most recent echocardiogram result is listed below. We will manage the valvular abnormality by seen in 2022 on echo at [facility]     No echocardiogram results found for the past 12 months      Chronic diastolic heart failure  Echo in 2022 with EF 55% and diastolic dysfunction. No acute issues        History of carotid stenosis  Per cards notes 33% right and 50% left        Hyponatremia  Hyponatremia is likely due to suspect intake but trend . The patient's most recent sodium results are listed below.        Recent Labs     09/29/24  0458 09/30/24  0523 10/01/24  0211   * 134* 135*         Plan  - Correct the sodium by 4-6mEq in 24 hours.   - Obtain the following studies: Urine sodium, urine osmolality, serum osmolality.  - Will treat the hyponatremia with IVF  - Monitor sodium Daily.   - Patient hyponatremia is  monitoring trend  -improving 129 to 134--135     HTN (hypertension)  -Pt. Not taking medications prior to recent admission, had previously been prescribed amlodipine, coreg, hctz in 2022. Restart previously prescribed med amlodipine at 5 mg. Monitor and titrate meds as needed and imrpoving now, with prn hydralazine as in ER was 180s-200 systolic\  BP higher 10/1 so inc norvasc to 10  Hctz  "added as was prev home med in past for higher BPS 10/2     Advanced dementia  -Pt. Alert to person only, recently placed in NH one day prior to fall. CT Head at [facility] 9/18 "Diffuse cerebral volume loss with medial temporal lobe predilection in a pattern concerning for underlying Alzheimer's dementia"  -Delirium precautions, continue home namenda    Consults: ortho, PM and R    Last CBC/BMP:    CBC/Anemia Labs: Coags:    Recent Labs   Lab 09/30/24  0524 10/01/24  0211 10/02/24  1238   WBC 10.80 9.29 11.98   HGB 9.8* 8.4* 10.1*   HCT 30.6* 26.9* 29.8*    252 314   MCV 87 89 85   RDW 14.9* 15.3* 15.1*    Recent Labs   Lab 09/28/24  1610   INR 1.0        Chemistries:   Recent Labs   Lab 09/30/24  0523 10/01/24  0211 10/02/24  1218 10/02/24  1238   * 135* 133*  --    K 4.0 3.6 3.7  --     110 106  --    CO2 21* 19* 19*  --    BUN 19 19 11  --    CREATININE 0.9 0.7 0.7  --    CALCIUM 8.6* 8.3* 8.9  --    PROT 6.1 5.2* 6.1  --    BILITOT 0.4 0.3 0.6  --    ALKPHOS 110 92 131  --    ALT 15 9* 16  --    AST 37 41* 45*  --    MG 1.7 1.6  --  1.5*   PHOS 3.3 2.3*  --  2.6*              Special Treatments/Procedures:   Procedure(s) (LRB):  INSERTION, INTRAMEDULLARY NORMA, FEMUR - right, earl, hana table (Right)     Disposition: Rehab Facility      Future Scheduled Appointments:  No future appointments.      Discharge Medication List:         Medication List        START taking these medications      acetaminophen 500 MG tablet  Commonly known as: TYLENOL  Take 2 tablets (1,000 mg total) by mouth every 8 (eight) hours.     albuterol-ipratropium 2.5 mg-0.5 mg/3 mL nebulizer solution  Commonly known as: DUO-NEB  Take 3 mLs by nebulization every 4 (four) hours as needed for Wheezing. Rescue     apixaban 2.5 mg Tab  Commonly known as: ELIQUIS  Take 1 tablet (2.5 mg total) by mouth 2 (two) times daily. End date 11/4/24     artificial tears 0.5 % ophthalmic solution  Commonly known as: ISOPTO TEARS  Place 1 " "drop into both eyes 4 (four) times daily as needed.     hydroCHLOROthiazide 12.5 MG Tab  Commonly known as: HYDRODIURIL  Take 1 tablet (12.5 mg total) by mouth once daily.     vitamin D 1000 units Tab  Commonly known as: VITAMIN D3  Take 1 tablet (1,000 Units total) by mouth once daily.            CHANGE how you take these medications      amLODIPine 10 MG tablet  Commonly known as: NORVASC  Take 1 tablet (10 mg total) by mouth once daily.  What changed:   medication strength  how much to take  when to take this            CONTINUE taking these medications      EScitalopram oxalate 10 MG tablet  Commonly known as: LEXAPRO     memantine 5 MG Tab  Commonly known as: NAMENDA            STOP taking these medications      aspirin 81 MG Chew               Where to Get Your Medications        Information about where to get these medications is not yet available    Ask your nurse or doctor about these medications  acetaminophen 500 MG tablet  albuterol-ipratropium 2.5 mg-0.5 mg/3 mL nebulizer solution  amLODIPine 10 MG tablet  apixaban 2.5 mg Tab  artificial tears 0.5 % ophthalmic solution  hydroCHLOROthiazide 12.5 MG Tab  vitamin D 1000 units Tab         Patient Instructions:  Discharge Procedure Orders   WHEELCHAIR FOR HOME USE     Order Specific Question Answer Comments   Hours in W/C per day: 8    Type of Wheelchair: Standard    Size(Width): 18"(STD adult)    Leg Support: STD footrests    Lap Belt: Velcro    Accessories: Anti-tippers    Cushion: Basic    Reclining Back No    Height: 5' 3" (1.6 m)    Weight: 61.2 kg (134 lb 14.7 oz)    Does patient have medical equipment at home? rollator    Length of need (1-99 months): 99    Please check all that apply: Caregiver is capable and willing to operate wheelchair safely.      Ambulatory referral/consult to Orthopedics Fracture Care   Standing Status: Future   Referral Priority: Routine Referral Type: Consultation   Requested Specialty: Orthopedic Surgery   Number of Visits " Requested: 1       At the time of discharge patient was told to take all medications as prescribed, to keep all followup appointments, and to call their primary care physician or return to the emergency room if they have any worsening or concerning symptoms.    I spent 35 minutes preparing the discharge      Signing Physician:  Amie Lindsay MD

## 2024-10-02 NOTE — SUBJECTIVE & OBJECTIVE
Interval history- doing okay this morning with nursing at bedside to try to help her eat breakfast and has eaten some of breakfast with some bites of grits and other food so far. She denies any pain and has had no issues with mental status since narcan given once on Monday after med induced oversedation. Worked with thearpy and improved mobility and walked into hallway. Workign on placement now as filled out paperwork for assisted living but they report not ready yet for their facility and need to rehab hip fx further before going there. CM working on placement now, likely rehab may be best option now that she is improving with her mobility a good bit today may be candidate and pm and r consulted      No current facility-administered medications on file prior to encounter.     Current Outpatient Medications on File Prior to Encounter   Medication Sig    EScitalopram oxalate (LEXAPRO) 10 MG tablet Take 1 tablet by mouth once daily.    memantine (NAMENDA) 5 MG Tab Take 1 tablet by mouth once daily.     Family History    None       Tobacco Use    Smoking status: Never    Smokeless tobacco: Never   Substance and Sexual Activity    Alcohol use: Not on file    Drug use: Not on file    Sexual activity: Not on file     Review of Systems   Unable to perform ROS: Dementia     Objective:     Vital Signs (Most Recent):  Temp: 98.8 °F (37.1 °C) (10/02/24 1215)  Pulse: 74 (10/02/24 1215)  Resp: 16 (10/02/24 1215)  BP: (!) 162/76 (10/02/24 1215)  SpO2: (!) 94 % (10/02/24 1215) Vital Signs (24h Range):  Temp:  [97.4 °F (36.3 °C)-98.8 °F (37.1 °C)] 98.8 °F (37.1 °C)  Pulse:  [66-88] 74  Resp:  [15-20] 16  SpO2:  [92 %-96 %] 94 %  BP: (146-190)/(69-85) 162/76     Weight: 61.2 kg (134 lb 14.7 oz)  Body mass index is 23.9 kg/m².     Physical Exam  Vitals reviewed.   Constitutional:       General: She is not in acute distress.     Appearance: She is well-developed.      Comments: Alert and awake   HENT:      Head: Normocephalic and  atraumatic.   Eyes:      Extraocular Movements: Extraocular movements intact.      Pupils: Pupils are equal, round, and reactive to light.   Neck:      Vascular: No JVD.      Trachea: No tracheal deviation.   Cardiovascular:      Rate and Rhythm: Normal rate and regular rhythm.      Heart sounds: Murmur heard.      No friction rub. No gallop.   Pulmonary:      Effort: No respiratory distress.      Breath sounds: Normal breath sounds. No wheezing or rales.   Abdominal:      General: Bowel sounds are normal. There is no distension.      Palpations: Abdomen is soft. There is no mass.      Tenderness: There is no abdominal tenderness.   Musculoskeletal:      Cervical back: Neck supple.      Comments: Bandages to RLE.   Lymphadenopathy:      Cervical: No cervical adenopathy.   Skin:     General: Skin is warm and dry.      Findings: No rash.   Neurological:      Mental Status: She is alert. Mental status is at baseline. She is disoriented.              CRANIAL NERVES     CN III, IV, VI   Pupils are equal, round, and reactive to light.       Significant Labs: All pertinent labs within the past 24 hours have been reviewed.    Significant Imaging: I have reviewed all pertinent imaging results/findings within the past 24 hours.

## 2024-10-02 NOTE — PROGRESS NOTES
[facility], An Acute Care Hospital Medicine  Progress Note    Patient Name: Toña Carrero  MRN: [id number]  Patient Class: IP- Inpatient   Admission Date: 9/28/2024  Length of Stay: 4 days  Attending Physician: Amie Lindsay MD  Primary Care Provider: Inez, Primary Doctor        Subjective:     Principal Problem:Displaced intertrochanteric fracture of right femur, initial encounter for closed fracture        HPI:  78 yo F with PMHx advanced dementia and HTN who presents to the ED from the [facility] after a fall today with noted R hip deformity. Pt. Was recently admitted to [facility] 9/18-9/27 for worsening confusion and debility which was ultimately determined to be related to progressive dementia. Pt.  reportedly also needed placement as they both have advanced dementia and unable to care for themselves. She was discharged yesterday and placed at the [facility] assisted living facility. Today, paient reportedly had a fall in the dining room at the facility. She had noted deformity, EMS was contacted and reportedly wrapped pt. Pelvis in a sheet because of stability concerns. In ED, X-ray obtained revealed an impacted intertrochanteric fracture of the right femur.    Overview/Hospital Course:  No notes on file    Interval history- doing okay this morning with nursing at bedside to try to help her eat breakfast and has eaten some of breakfast with some bites of grits and other food so far. She denies any pain and has had no issues with mental status since narcan given once on Monday after med induced oversedation. Worked with thearpy and improved mobility and walked into hallway. Workign on placement now as filled out paperwork for assisted living but they report not ready yet for their facility and need to rehab hip fx further before going there. CM working on placement now, likely rehab may be best option now that she is improving with her mobility a good bit today may be candidate and pm and r  consulted      No current facility-administered medications on file prior to encounter.     Current Outpatient Medications on File Prior to Encounter   Medication Sig    EScitalopram oxalate (LEXAPRO) 10 MG tablet Take 1 tablet by mouth once daily.    memantine (NAMENDA) 5 MG Tab Take 1 tablet by mouth once daily.     Family History    None       Tobacco Use    Smoking status: Never    Smokeless tobacco: Never   Substance and Sexual Activity    Alcohol use: Not on file    Drug use: Not on file    Sexual activity: Not on file     Review of Systems   Unable to perform ROS: Dementia     Objective:     Vital Signs (Most Recent):  Temp: 98.8 °F (37.1 °C) (10/02/24 1215)  Pulse: 74 (10/02/24 1215)  Resp: 16 (10/02/24 1215)  BP: (!) 162/76 (10/02/24 1215)  SpO2: (!) 94 % (10/02/24 1215) Vital Signs (24h Range):  Temp:  [97.4 °F (36.3 °C)-98.8 °F (37.1 °C)] 98.8 °F (37.1 °C)  Pulse:  [66-88] 74  Resp:  [15-20] 16  SpO2:  [92 %-96 %] 94 %  BP: (146-190)/(69-85) 162/76     Weight: 61.2 kg (134 lb 14.7 oz)  Body mass index is 23.9 kg/m².     Physical Exam  Vitals reviewed.   Constitutional:       General: She is not in acute distress.     Appearance: She is well-developed.      Comments: Alert and awake   HENT:      Head: Normocephalic and atraumatic.   Eyes:      Extraocular Movements: Extraocular movements intact.      Pupils: Pupils are equal, round, and reactive to light.   Neck:      Vascular: No JVD.      Trachea: No tracheal deviation.   Cardiovascular:      Rate and Rhythm: Normal rate and regular rhythm.      Heart sounds: Murmur heard.      No friction rub. No gallop.   Pulmonary:      Effort: No respiratory distress.      Breath sounds: Normal breath sounds. No wheezing or rales.   Abdominal:      General: Bowel sounds are normal. There is no distension.      Palpations: Abdomen is soft. There is no mass.      Tenderness: There is no abdominal tenderness.   Musculoskeletal:      Cervical back: Neck supple.       Comments: Bandages to RLE.   Lymphadenopathy:      Cervical: No cervical adenopathy.   Skin:     General: Skin is warm and dry.      Findings: No rash.   Neurological:      Mental Status: She is alert. Mental status is at baseline. She is disoriented.              CRANIAL NERVES     CN III, IV, VI   Pupils are equal, round, and reactive to light.       Significant Labs: All pertinent labs within the past 24 hours have been reviewed.    Significant Imaging: I have reviewed all pertinent imaging results/findings within the past 24 hours.    Assessment/Plan:      * Displaced intertrochanteric fracture of right femur, initial encounter for closed fracture  -sustained in mechanical fall at the [facility] unit after just admitting there a day prior after being at  for placement previously.  -s/p IM nail with ortho today with wbat with PT/OT and plan for pt/ot at [facility] on discharge  -eliquis for 35 days with end date nov 4th  -multimodal pain meds and bowel regimen. PNC in place. Had bad reaction to either oxy vs scheduled 750 robaxni as both given 9/30 and narcan given in PM for oversedation with rapid improevment. Discussed with pain team who rec dec dose of robaxin and change to prn. Oxy had already been dc eralier in day. No pain Tuesday and not given any PRNS so will keep off the PRNS on discharge as not requiring and unclear if reaction was too robaxin or oxy from earlier that day  -plan for placement before going to assisted living now per 10/2, improving moblity but not independent enough yet for facility, pm and r consulted for possible rehab  -vit D mildly low at 27 and replacement started        Hypophosphatemia  Patient has Abnormal Phosphorus: hypophosphatemia. Will continue to monitor electrolytes closely. Will replace the affected electrolytes and repeat labs to be done after interventions completed. The patient's phosphorus results have been reviewed and are listed  below.  Recent Labs   Lab 10/01/24  0211   PHOS 2.3*        Itchy eyes  Refractory to eye drops, anesthesia came to see with fluorosceien ons unday and starred cipro eye drops to right eye so possible corneal irritation from surgery, not fully documented in notes but will continue. Denies itching/burning 9/30      Acute blood loss anemia  Anemia is likely due to acute blood loss which was from surgery . Most recent hemoglobin and hematocrit are listed below.  Recent Labs     09/28/24  1610 09/29/24  0458 09/30/24  0524   HGB 12.4 11.7* 9.8*   HCT 38.0 36.8* 30.6*     Plan  - Monitor serial CBC: Daily  - Transfuse PRBC if patient becomes hemodynamically unstable, symptomatic or H/H drops below 7/21.  - Patient has not received any PRBC transfusions to date  - Patient's anemia is currently stable  -    Vitamin D insufficiency  Mildly low at 27 and replacement started      Mild tricuspid regurgitation  Echocardiogram with evidence of tricuspid regurgitation that is mild . The patient's most recent echocardiogram result is listed below. We will manage the valvular abnormality by seen in 2022 at echo at osh    No echocardiogram results found for the past 12 months     Mild mitral regurgitation  Echocardiogram with evidence of mitral regurgitation that is mild . The patient's most recent echocardiogram result is listed below. We will manage the valvular abnormality by seen in 2022 on echo at [facility]    No echocardiogram results found for the past 12 months     Chronic diastolic heart failure  Echo in 2022 with EF 55% and diastolic dysfunction. No acute issues      History of carotid stenosis  Per cards notes 33% right and 50% left      Hyponatremia  Hyponatremia is likely due to suspect intake but trend . The patient's most recent sodium results are listed below.  Recent Labs     09/29/24  0458 09/30/24  0523 10/01/24  0211   * 134* 135*       Plan  - Correct the sodium by 4-6mEq in 24 hours.   - Obtain the following  "studies: Urine sodium, urine osmolality, serum osmolality.  - Will treat the hyponatremia with IVF  - Monitor sodium Daily.   - Patient hyponatremia is  monitoring trend  -improving 129 to 134--135    HTN (hypertension)  -Pt. Not taking medications prior to recent admission, had previously been prescribed amlodipine, coreg, hctz in 2022. Restart previously prescribed med amlodipine at 5 mg. Monitor and titrate meds as needed and imrpoving now, with prn hydralazine as in ER was 180s-200 systolic\  BP higher 10/1 so inc norvasc to 10  Hctz added as was prev home med in past for higher BPS 10/2    Advanced dementia  -Pt. Alert to person only, recently placed in NH one day prior to fall. CT Head at [facility] 9/18 "Diffuse cerebral volume loss with medial temporal lobe predilection in a pattern concerning for underlying Alzheimer's dementia"  -Delirium precautions, continue home namenda      VTE Risk Mitigation (From admission, onward)           Ordered     apixaban tablet 2.5 mg  2 times daily         09/29/24 0926     IP VTE HIGH RISK PATIENT  Once         09/29/24 0602     Place sequential compression device  Until discontinued         09/29/24 0602     Place sequential compression device  Until discontinued         09/28/24 2132                    Discharge Planning   CHRIS: 10/2/2024     Code Status: Full Code   Is the patient medically ready for discharge?:     Reason for patient still in hospital (select all that apply): Patient trending condition  Discharge Plan A: Skilled Nursing Facility                  Amie Lindsay MD  Department of Hospital Medicine   [facility] - Surgery    "

## 2024-10-02 NOTE — PROGRESS NOTES
[facility]  Orthopedics  Progress Note    Patient Name: Toña Carrero  MRN: [id number]  Admission Date: 9/28/2024  Hospital Length of Stay: 4 days  Attending Provider: Amie Lindsay MD  Primary Care Provider: Inez, Primary Doctor  Follow-up For: Procedure(s) (LRB):  INSERTION, INTRAMEDULLARY NORMA, FEMUR - right, earl, hana table (Right)    Post-Operative Day: 3 Days Post-Op  Subjective:     Principal Problem:Displaced intertrochanteric fracture of right femur, initial encounter for closed fracture    Principal Orthopedic Problem: as above; s/p IMN 9/29    Interval History: POD-3. Pt seen and examined at bedside. HDS, afebrile.  NAEON. Reported improved pain of right hip. Reports no new symptoms. Denies fevers, chills, N/V. Worked with PT yesterday, unable to stand and walk due to pain at the time; PT recommends moderate intensity therapy at discharge.      Review of patient's allergies indicates:  No Known Allergies    Current Facility-Administered Medications   Medication    0.9%  NaCl infusion (for blood administration)    acetaminophen tablet 1,000 mg    amLODIPine tablet 10 mg    apixaban tablet 2.5 mg    artificial tears 0.5 % ophthalmic solution 1 drop    bisacodyL suppository 10 mg    ciprofloxacin HCl 0.3 % ophthalmic solution 2 drop    dextrose 10% bolus 125 mL 125 mL    dextrose 10% bolus 250 mL 250 mL    EScitalopram oxalate tablet 10 mg    fentaNYL 50 mcg/mL injection 25 mcg    glucagon (human recombinant) injection 1 mg    hydrALAZINE tablet 50 mg    LIDOcaine (PF) 10 mg/ml (1%) injection 10 mg    melatonin tablet 6 mg    memantine tablet 5 mg    methocarbamoL tablet 500 mg    mupirocin 2 % ointment 1 g    mupirocin 2 % ointment 1 g    mupirocin 2 % ointment    naloxone 0.4 mg/mL injection 0.4 mg    ondansetron injection 4 mg    potassium, sodium phosphates 280-160-250 mg packet 1 packet    ropivacaine 0.2% Perineural Pump infusion 500 ML    sodium chloride 0.9% flush 10 mL    sodium  "chloride 0.9% flush 10 mL    tranexamic acid (CYKLOKAPRON) 1,000 mg in 0.9% NaCl 100 mL IVPB (MB+)    tranexamic acid (CYKLOKAPRON) 3,000 mg in 0.9% NaCl SolP 100 mL    vitamin D 1000 units tablet 1,000 Units     Objective:     Vital Signs (Most Recent):  Temp: 97.4 °F (36.3 °C) (10/02/24 0421)  Pulse: 72 (10/02/24 0421)  Resp: 18 (10/02/24 0421)  BP: (!) 184/83 (10/02/24 0421)  SpO2: (!) 92 % (10/02/24 0421) Vital Signs (24h Range):  Temp:  [97.4 °F (36.3 °C)-98.5 °F (36.9 °C)] 97.4 °F (36.3 °C)  Pulse:  [66-83] 72  Resp:  [16-20] 18  SpO2:  [92 %-96 %] 92 %  BP: (146-190)/(69-85) 184/83     Weight: 61.2 kg (134 lb 14.7 oz)  Height: 5' 3" (160 cm)  Body mass index is 23.9 kg/m².    No intake or output data in the 24 hours ending 10/02/24 0559       Ortho/SPM Exam  NAD, resting comfortably in bed  AAOx3  No increased WOB  Extremities WWP    RLE    Dressing C/D/I   Appropriate post-op swelling, erythema.   Compartments soft/compressible  SILT throughout  Motor intact TA/EHL/Gastroc/FHL   DP pulse 2+. Brisk cap refill             Significant Labs: CBC:   Recent Labs   Lab 10/01/24  0211   WBC 9.29   HGB 8.4*   HCT 26.9*        CMP:   Recent Labs   Lab 10/01/24  0211   *   K 3.6      CO2 19*   GLU 89   BUN 19   CREATININE 0.7   CALCIUM 8.3*   PROT 5.2*   ALBUMIN 2.6*   BILITOT 0.3   ALKPHOS 92   AST 41*   ALT 9*   ANIONGAP 6*     All pertinent labs within the past 24 hours have been reviewed.    Significant Imaging: I have reviewed and interpreted all pertinent imaging results/findings.  Assessment/Plan:     * Displaced intertrochanteric fracture of right femur, initial encounter for closed fracture  Toña Carrero is a 79 y.o. female with Right IT fracture, closed, NVI. They take no anticoagulation at home. They required walker for ambulation prior to this injury.     -Admitted to medicine hip fracture service   -DVT PPx: Eliquis 2.5 BID  -Abx: post op ancef 24 hrs complete  -Pain: multimodal pain " control  -PT/OT - WBAT RLE  -Regular diet    »Dispo: PT recommends moderate intensity therapy at discharge          Kylie Mcqueen MD  Orthopedics  [facility] - Surgery

## 2024-10-02 NOTE — PT/OT/SLP PROGRESS
"Physical Therapy Treatment/co treat with OT    Patient Name:  Toña Carrero   MRN:  [id number]    Recommendations:     Discharge Recommendations: Moderate Intensity Therapy  Discharge Equipment Recommendations: bedside commode, walker, rolling, wheelchair  Barriers to discharge: Decreased caregiver support  Requires assist for mobility  Assessment:     Toña Carrero is a 79 y.o. female admitted with a medical diagnosis of Displaced intertrochanteric fracture of right femur, initial encounter for closed fracture.  She presents with the following impairments/functional limitations: weakness, impaired functional mobility, gait instability, impaired balance, impaired self care skills, decreased lower extremity function, pain, decreased safety awareness, impaired cognition, orthopedic precautions . Pt is unsafe with functional mobility at this time due to pt requires moderate assist for bed mobility, max assist for transfers, and max assist +1 to follow with bedside chair for safety for gait due to pain, instability, and fatigue. Pt is motivated to progress with functional mobility.     Rehab Prognosis: Good; patient would benefit from acute skilled PT services to address these deficits and reach maximum level of function.    Recent Surgery: Procedure(s) (LRB):  INSERTION, INTRAMEDULLARY NORMA, FEMUR - right, earl, hana table (Right) 3 Days Post-Op    Plan:     During this hospitalization, patient to be seen 4 x/week to address the identified rehab impairments via gait training, therapeutic activities, therapeutic exercises, neuromuscular re-education and progress toward the following goals:    Plan of Care Expires:  10/30/24    Subjective   "It hurts"    Pain/Comfort:  Pain Rating 1:  (pt c/o pain with R LE movement and R LE weight bearing- unable to grade pain)  Location - Side 1: Right  Location - Orientation 1: generalized  Location 1: hip  Pain Addressed 1: Reposition, Cessation of Activity, Nurse notified  Pain " Rating Post-Intervention 1:  (pt appeared comfortable upon return to supine- did not c/o pain)      Objective:     Communicated with nurse prior to session.  Patient found HOB elevated with bed alarm, telemetry, FCD upon PT entry to room.     General Precautions: Standard, fall  Orthopedic Precautions: RLE weight bearing as tolerated  Braces: N/A  Respiratory Status: Room air     Functional Mobility:  Bed Mobility:     Supine to Sit: moderate assistance  Sit to Supine: total assistance  Transfers:     Sit to Stand:  maximal assistance with rolling walker  Bed to bedside commode to bedside chair then bedside chair to bed: maximal assistance with  hand-held assist  using  Stand Pivot  Gait: 8ft x 2 trials with RW with max assist +1 to follow with bedside chair. Pt performed gait with flexed trunk, posterior lean at times due to c/o pain with R LE weight bearing, and at slow pace. Pt required manual cues to direct the walker. Pt rested in sitting between gait trials.     AM-PAC 6 CLICK MOBILITY  Turning over in bed (including adjusting bedclothes, sheets and blankets)?: 2  Sitting down on and standing up from a chair with arms (e.g., wheelchair, bedside commode, etc.): 2  Moving from lying on back to sitting on the side of the bed?: 2  Moving to and from a bed to a chair (including a wheelchair)?: 2  Need to walk in hospital room?: 2  Climbing 3-5 steps with a railing?: 1  Basic Mobility Total Score: 11     Treatment & Education:  Pt noted to be soiled with BM upon coming to sit. Pt stood with HHA with moderate assist to be cleaned and pt began to urinate and it was noted that she was still having a BM. Pt then was transferred to the bedside commode as above and urinated and had a BM on the commode. Nurse notified. Pt assisted to stand x 2 trials with HHA and moderate to max assist to be cleaned and mesh underwear and pad to control output. Pt noted to be urinated each standing trial and pt stood with max assist to be  cleaned and linen replaced prior to return to supine. Nurse notified.   Co-treat with OT due to medical complexity of pt and need for skilled hands for safe intervention.   Patient left supine with all lines intact, call button in reach, bed alarm on, and nurse notified..    GOALS:   Multidisciplinary Problems       Physical Therapy Goals          Problem: Physical Therapy    Goal Priority Disciplines Outcome Interventions   Physical Therapy Goal     PT, PT/OT Progressing    Description: Goals to be met by: 10/30/2024     Patient will increase functional independence with mobility by performin. Supine to sit with Minimal Assistance  2. Sit to stand transfer with Minimal Assistance  3. Bed to chair transfer with Minimal Assistance using Rolling Walker  4. Gait x 20 feet with Minimal Assistance using Rolling Walker                          Time Tracking:     PT Received On: 10/02/24  PT Start Time: 919     PT Stop Time: 1005  PT Total Time (min): 46 min     Billable Minutes: Gait Training 26 and Therapeutic Activity 20    Treatment Type: Treatment  PT/PTA: PT     Number of PTA visits since last PT visit: 0     10/02/2024

## 2024-10-02 NOTE — PLAN OF CARE
Problem: Physical Therapy  Goal: Physical Therapy Goal  Description: Goals to be met by: 10/30/2024     Patient will increase functional independence with mobility by performin. Supine to sit with Minimal Assistance  2. Sit to stand transfer with Minimal Assistance  3. Bed to chair transfer with Minimal Assistance using Rolling Walker  4. Gait x 20 feet with Minimal Assistance using Rolling Walker     Outcome: Progressing   Pt's goals remain appropriate and pt will continue to benefit from skilled PT services to work towards improved functional mobility including: bed mobility, transfers, and gait. Fern Brewer PT  10/2/2024

## 2024-10-03 ENCOUNTER — TELEPHONE (OUTPATIENT)
Dept: ORTHOPEDICS | Facility: CLINIC | Age: 79
End: 2024-10-03
Payer: MEDICARE

## 2024-10-07 ENCOUNTER — HOSPITAL ENCOUNTER (OUTPATIENT)
Dept: RADIOLOGY | Facility: HOSPITAL | Age: 79
Discharge: HOME | End: 2024-10-07
Attending: PHYSICAL MEDICINE & REHABILITATION
Payer: MEDICARE

## 2024-10-07 PROCEDURE — 71045 X-RAY EXAM CHEST 1 VIEW: CPT | Mod: 26,,, | Performed by: INTERNAL MEDICINE

## 2024-10-07 NOTE — PLAN OF CARE
[facility]  Discharge Final Note    Primary Care Provider: No, Primary Doctor    Expected Discharge Date: 10/2/2024    Final Discharge Note (most recent)       Final Note - 10/02/24 1825          Final Note    Assessment Type Final Discharge Note     Anticipated Discharge Disposition Rehab Facility   [facility] Resources/Appts/Education Provided Provided patient/caregiver with written discharge plan information;Provided education on problems/symptoms using teachback                   Future Appointments   Date Time Provider Department Center   10/14/2024 11:15 AM Nasir, Geovanny K, NP [facility] [address]   11/11/2024 10:45 AM Nasir, Geovanny K, NP [facility] [address]

## 2024-10-08 ENCOUNTER — HOSPITAL ENCOUNTER (OUTPATIENT)
Dept: RADIOLOGY | Facility: HOSPITAL | Age: 79
Discharge: HOME | End: 2024-10-08
Attending: PHYSICAL MEDICINE & REHABILITATION
Payer: MEDICARE

## 2024-10-08 DIAGNOSIS — M25.551 HIP PAIN, RIGHT: ICD-10-CM

## 2024-10-08 DIAGNOSIS — M25.551 HIP PAIN, RIGHT: Primary | ICD-10-CM

## 2024-10-08 PROCEDURE — 72170 X-RAY EXAM OF PELVIS: CPT | Mod: TC

## 2024-10-08 PROCEDURE — 72170 X-RAY EXAM OF PELVIS: CPT | Mod: 26,,, | Performed by: RADIOLOGY

## 2024-10-08 PROCEDURE — 73552 X-RAY EXAM OF FEMUR 2/>: CPT | Mod: 26,RT,, | Performed by: INTERNAL MEDICINE

## (undated) DEVICE — PAD CAST SPECIALIST STRL 4

## (undated) DEVICE — SEE ITEM #798

## (undated) DEVICE — DRAPE STERI

## (undated) DEVICE — TRAY MINOR ORTHO OMC

## (undated) DEVICE — SUT VICRYL PLUS 0 CT1 18IN

## (undated) DEVICE — ELECTRODE REM PLYHSV RETURN 9

## (undated) DEVICE — BLADE SURG CARBON STEEL #10

## (undated) DEVICE — BANDAGE MATRIX HK LOOP 6IN 5YD

## (undated) DEVICE — APPLICATOR CHLORAPREP ORN 26ML

## (undated) DEVICE — GLOVE BIOGEL SKINSENSE PI 7.0

## (undated) DEVICE — SUT 3-0 VICRYL PLUS CP-1

## (undated) DEVICE — DRAPE C-ARM ELAS CLIP 42X120IN

## (undated) DEVICE — TAPE SURG MEDIPORE 6X72IN

## (undated) DEVICE — REAMER MOD BIXCUT 8X48MM STER.

## (undated) DEVICE — DRILL T2 ALPH FREHND 4.2X185MM

## (undated) DEVICE — PAD ABDOMINAL STERILE 8X10IN

## (undated) DEVICE — PADDING WYTEX UNDRCST 6INX4YD

## (undated) DEVICE — Device